# Patient Record
Sex: FEMALE | Race: WHITE | Employment: FULL TIME | URBAN - NONMETROPOLITAN AREA
[De-identification: names, ages, dates, MRNs, and addresses within clinical notes are randomized per-mention and may not be internally consistent; named-entity substitution may affect disease eponyms.]

---

## 2017-01-30 ENCOUNTER — TELEPHONE (OUTPATIENT)
Dept: CARDIOLOGY | Age: 62
End: 2017-01-30

## 2017-01-30 DIAGNOSIS — I47.1 SVT (SUPRAVENTRICULAR TACHYCARDIA) (HCC): Primary | ICD-10-CM

## 2017-01-30 DIAGNOSIS — R00.2 FLUTTERING SENSATION OF HEART: ICD-10-CM

## 2017-01-30 DIAGNOSIS — R42 LIGHTHEADEDNESS: ICD-10-CM

## 2017-02-21 ENCOUNTER — TELEPHONE (OUTPATIENT)
Dept: CARDIOLOGY | Age: 62
End: 2017-02-21

## 2017-03-30 ENCOUNTER — OFFICE VISIT (OUTPATIENT)
Dept: CARDIOLOGY | Age: 62
End: 2017-03-30

## 2017-03-30 VITALS
BODY MASS INDEX: 20.31 KG/M2 | SYSTOLIC BLOOD PRESSURE: 128 MMHG | DIASTOLIC BLOOD PRESSURE: 78 MMHG | WEIGHT: 141.9 LBS | HEIGHT: 70 IN

## 2017-03-30 DIAGNOSIS — R53.83 FATIGUE, UNSPECIFIED TYPE: ICD-10-CM

## 2017-03-30 DIAGNOSIS — I47.1 SVT (SUPRAVENTRICULAR TACHYCARDIA) (HCC): Primary | ICD-10-CM

## 2017-03-30 PROCEDURE — 93000 ELECTROCARDIOGRAM COMPLETE: CPT | Performed by: INTERNAL MEDICINE

## 2017-03-30 PROCEDURE — 99213 OFFICE O/P EST LOW 20 MIN: CPT | Performed by: INTERNAL MEDICINE

## 2017-03-30 RX ORDER — DILTIAZEM HYDROCHLORIDE 180 MG/1
180 CAPSULE, COATED, EXTENDED RELEASE ORAL DAILY
Qty: 30 CAPSULE | Refills: 5 | Status: SHIPPED | OUTPATIENT
Start: 2017-03-30 | End: 2017-09-28 | Stop reason: SDUPTHER

## 2017-03-30 RX ORDER — DILTIAZEM HYDROCHLORIDE 180 MG/1
180 CAPSULE, COATED, EXTENDED RELEASE ORAL DAILY
COMMUNITY
End: 2017-03-30 | Stop reason: SDUPTHER

## 2017-08-03 ENCOUNTER — HOSPITAL ENCOUNTER (EMERGENCY)
Age: 62
Discharge: HOME | End: 2017-08-03

## 2017-08-03 VITALS
DIASTOLIC BLOOD PRESSURE: 77 MMHG | WEIGHT: 139 LBS | OXYGEN SATURATION: 98 % | SYSTOLIC BLOOD PRESSURE: 118 MMHG | HEART RATE: 74 BPM | BODY MASS INDEX: 19.9 KG/M2 | TEMPERATURE: 98.2 F | RESPIRATION RATE: 16 BRPM | HEIGHT: 70 IN

## 2017-08-03 DIAGNOSIS — J20.9 ACUTE BRONCHITIS, UNSPECIFIED ORGANISM: Primary | ICD-10-CM

## 2017-08-03 PROCEDURE — 99213 OFFICE O/P EST LOW 20 MIN: CPT | Performed by: NURSE PRACTITIONER

## 2017-08-03 PROCEDURE — 99212 OFFICE O/P EST SF 10 MIN: CPT

## 2017-08-03 RX ORDER — GUAIFENESIN AND CODEINE PHOSPHATE 100; 10 MG/5ML; MG/5ML
5 SOLUTION ORAL 3 TIMES DAILY PRN
Qty: 120 ML | Refills: 0 | Status: SHIPPED | OUTPATIENT
Start: 2017-08-03 | End: 2017-08-10

## 2017-08-03 RX ORDER — DOXYCYCLINE HYCLATE 100 MG/1
100 CAPSULE ORAL 2 TIMES DAILY
Qty: 20 CAPSULE | Refills: 0 | Status: SHIPPED | OUTPATIENT
Start: 2017-08-03 | End: 2017-08-13

## 2017-08-03 RX ORDER — BENZONATATE 200 MG/1
200 CAPSULE ORAL 3 TIMES DAILY PRN
Qty: 21 CAPSULE | Refills: 0 | Status: SHIPPED | OUTPATIENT
Start: 2017-08-03 | End: 2017-08-10

## 2017-08-03 ASSESSMENT — ENCOUNTER SYMPTOMS
SINUS PRESSURE: 0
DIARRHEA: 0
COUGH: 1
CHEST TIGHTNESS: 0
SORE THROAT: 0
EYE DISCHARGE: 0
SINUS CONGESTION: 0
SHORTNESS OF BREATH: 1
NAUSEA: 1
CONSTIPATION: 0
VOMITING: 0
WHEEZING: 0
RHINORRHEA: 0

## 2017-08-08 ENCOUNTER — HOSPITAL ENCOUNTER (OUTPATIENT)
Age: 62
Discharge: HOME | End: 2017-08-08

## 2017-08-08 ENCOUNTER — OFFICE VISIT (OUTPATIENT)
Dept: INTERNAL MEDICINE CLINIC | Age: 62
End: 2017-08-08

## 2017-08-08 VITALS
DIASTOLIC BLOOD PRESSURE: 70 MMHG | TEMPERATURE: 98.1 F | OXYGEN SATURATION: 98 % | HEIGHT: 70 IN | BODY MASS INDEX: 20.04 KG/M2 | HEART RATE: 80 BPM | SYSTOLIC BLOOD PRESSURE: 100 MMHG | WEIGHT: 140 LBS

## 2017-08-08 DIAGNOSIS — R50.9 FEVER, UNSPECIFIED FEVER CAUSE: ICD-10-CM

## 2017-08-08 DIAGNOSIS — J40 BRONCHITIS: ICD-10-CM

## 2017-08-08 DIAGNOSIS — I10 ESSENTIAL HYPERTENSION: ICD-10-CM

## 2017-08-08 DIAGNOSIS — J40 BRONCHITIS: Primary | ICD-10-CM

## 2017-08-08 DIAGNOSIS — J06.9 UPPER RESPIRATORY TRACT INFECTION, UNSPECIFIED TYPE: Primary | ICD-10-CM

## 2017-08-08 LAB
ANION GAP SERPL CALCULATED.3IONS-SCNC: 12 MEQ/L (ref 8–16)
BILIRUBIN URINE: NEGATIVE
BLOOD, URINE: NEGATIVE
BUN BLDV-MCNC: 17 MG/DL (ref 7–22)
CALCIUM SERPL-MCNC: 9.7 MG/DL (ref 8.5–10.5)
CHARACTER, URINE: CLEAR
CHLORIDE BLD-SCNC: 102 MEQ/L (ref 98–111)
CO2: 27 MEQ/L (ref 23–33)
COLOR: YELLOW
CREAT SERPL-MCNC: 0.7 MG/DL (ref 0.4–1.2)
GFR SERPL CREATININE-BSD FRML MDRD: 85 ML/MIN/1.73M2
GLUCOSE BLD-MCNC: 101 MG/DL (ref 70–108)
GLUCOSE URINE: NEGATIVE MG/DL
HCT VFR BLD CALC: 40 % (ref 37–47)
HEMOGLOBIN: 13.6 GM/DL (ref 12–16)
KETONES, URINE: NEGATIVE
LEUKOCYTE ESTERASE, URINE: NEGATIVE
MCH RBC QN AUTO: 30.7 PG (ref 27–31)
MCHC RBC AUTO-ENTMCNC: 33.9 GM/DL (ref 33–37)
MCV RBC AUTO: 90.6 FL (ref 81–99)
NITRITE, URINE: NEGATIVE
PDW BLD-RTO: 13.6 % (ref 11.5–14.5)
PH UA: 5.5
PLATELET # BLD: 231 THOU/MM3 (ref 130–400)
PMV BLD AUTO: 8.5 MCM (ref 7.4–10.4)
POTASSIUM SERPL-SCNC: 4.2 MEQ/L (ref 3.5–5.2)
PROTEIN UA: NEGATIVE
RBC # BLD: 4.42 MILL/MM3 (ref 4.2–5.4)
SODIUM BLD-SCNC: 141 MEQ/L (ref 135–145)
SPECIFIC GRAVITY, URINE: 1.01 (ref 1–1.03)
UROBILINOGEN, URINE: 0.2 EU/DL
WBC # BLD: 4.1 THOU/MM3 (ref 4.8–10.8)

## 2017-08-08 PROCEDURE — 36415 COLL VENOUS BLD VENIPUNCTURE: CPT

## 2017-08-08 PROCEDURE — 99213 OFFICE O/P EST LOW 20 MIN: CPT | Performed by: INTERNAL MEDICINE

## 2017-08-08 PROCEDURE — 80048 BASIC METABOLIC PNL TOTAL CA: CPT

## 2017-08-08 PROCEDURE — 85027 COMPLETE CBC AUTOMATED: CPT

## 2017-08-08 PROCEDURE — 81003 URINALYSIS AUTO W/O SCOPE: CPT

## 2017-08-08 RX ORDER — AMOXICILLIN AND CLAVULANATE POTASSIUM 875; 125 MG/1; MG/1
1 TABLET, FILM COATED ORAL 2 TIMES DAILY
Qty: 10 TABLET | Refills: 0 | Status: SHIPPED | OUTPATIENT
Start: 2017-08-08 | End: 2017-08-13

## 2017-08-16 RX ORDER — PSEUDOEPHEDRINE HYDROCHLORIDE 30 MG/1
60 TABLET ORAL EVERY 6 HOURS
Qty: 40 TABLET | Refills: 0 | OUTPATIENT
Start: 2017-08-16 | End: 2017-09-28 | Stop reason: ALTCHOICE

## 2017-08-21 ENCOUNTER — TELEPHONE (OUTPATIENT)
Dept: ENT CLINIC | Age: 62
End: 2017-08-21

## 2017-08-22 ENCOUNTER — OFFICE VISIT (OUTPATIENT)
Dept: ENT CLINIC | Age: 62
End: 2017-08-22

## 2017-08-22 VITALS
HEIGHT: 70 IN | DIASTOLIC BLOOD PRESSURE: 74 MMHG | SYSTOLIC BLOOD PRESSURE: 122 MMHG | TEMPERATURE: 97.7 F | BODY MASS INDEX: 19.81 KG/M2 | WEIGHT: 138.4 LBS | RESPIRATION RATE: 16 BRPM | HEART RATE: 68 BPM

## 2017-08-22 DIAGNOSIS — H61.22 IMPACTED CERUMEN OF LEFT EAR: ICD-10-CM

## 2017-08-22 DIAGNOSIS — R49.0 HOARSENESS: ICD-10-CM

## 2017-08-22 DIAGNOSIS — R05.9 COUGH: ICD-10-CM

## 2017-08-22 DIAGNOSIS — K21.9 GASTROESOPHAGEAL REFLUX DISEASE WITHOUT ESOPHAGITIS: ICD-10-CM

## 2017-08-22 DIAGNOSIS — R09.89 GLOBUS SENSATION: Primary | ICD-10-CM

## 2017-08-22 PROCEDURE — 99243 OFF/OP CNSLTJ NEW/EST LOW 30: CPT | Performed by: OTOLARYNGOLOGY

## 2017-08-22 PROCEDURE — 31575 DIAGNOSTIC LARYNGOSCOPY: CPT | Performed by: OTOLARYNGOLOGY

## 2017-08-22 PROCEDURE — 69210 REMOVE IMPACTED EAR WAX UNI: CPT | Performed by: OTOLARYNGOLOGY

## 2017-08-22 PROCEDURE — 99999 PR EAR MICROSCOPY EXAMINATION: CPT | Performed by: OTOLARYNGOLOGY

## 2017-08-22 RX ORDER — OMEPRAZOLE 20 MG/1
20 CAPSULE, DELAYED RELEASE ORAL DAILY
Qty: 30 CAPSULE | Refills: 3 | Status: SHIPPED | OUTPATIENT
Start: 2017-08-22 | End: 2017-09-28

## 2017-08-22 RX ORDER — AZITHROMYCIN 250 MG/1
TABLET, FILM COATED ORAL
Qty: 2 PACKET | Refills: 0 | Status: SHIPPED | OUTPATIENT
Start: 2017-08-22 | End: 2017-09-28 | Stop reason: ALTCHOICE

## 2017-08-22 ASSESSMENT — ENCOUNTER SYMPTOMS
APNEA: 0
NAUSEA: 0
TROUBLE SWALLOWING: 1
WHEEZING: 0
VOMITING: 0
CHEST TIGHTNESS: 0
ABDOMINAL PAIN: 0
SHORTNESS OF BREATH: 1
CHOKING: 1
FACIAL SWELLING: 0
SINUS PRESSURE: 1
RHINORRHEA: 1
STRIDOR: 0
VOICE CHANGE: 1
COLOR CHANGE: 0
COUGH: 1
DIARRHEA: 0
SORE THROAT: 0

## 2017-09-28 ENCOUNTER — OFFICE VISIT (OUTPATIENT)
Dept: CARDIOLOGY CLINIC | Age: 62
End: 2017-09-28

## 2017-09-28 VITALS
BODY MASS INDEX: 19.74 KG/M2 | SYSTOLIC BLOOD PRESSURE: 128 MMHG | HEART RATE: 68 BPM | DIASTOLIC BLOOD PRESSURE: 62 MMHG | HEIGHT: 70 IN | WEIGHT: 137.9 LBS

## 2017-09-28 DIAGNOSIS — I47.1 SVT (SUPRAVENTRICULAR TACHYCARDIA) (HCC): ICD-10-CM

## 2017-09-28 DIAGNOSIS — I10 ESSENTIAL HYPERTENSION: ICD-10-CM

## 2017-09-28 DIAGNOSIS — I71.20 THORACIC AORTIC ANEURYSM WITHOUT RUPTURE: ICD-10-CM

## 2017-09-28 DIAGNOSIS — R06.02 SOB (SHORTNESS OF BREATH): Primary | ICD-10-CM

## 2017-09-28 PROCEDURE — 99213 OFFICE O/P EST LOW 20 MIN: CPT | Performed by: INTERNAL MEDICINE

## 2017-09-28 RX ORDER — DILTIAZEM HYDROCHLORIDE 180 MG/1
180 CAPSULE, COATED, EXTENDED RELEASE ORAL DAILY
Qty: 30 CAPSULE | Refills: 6 | Status: SHIPPED | OUTPATIENT
Start: 2017-09-28 | End: 2018-04-11 | Stop reason: SDUPTHER

## 2017-10-12 ENCOUNTER — HOSPITAL ENCOUNTER (OUTPATIENT)
Dept: NON INVASIVE DIAGNOSTICS | Age: 62
Discharge: HOME | End: 2017-10-12

## 2017-10-12 DIAGNOSIS — R06.02 SOB (SHORTNESS OF BREATH): ICD-10-CM

## 2017-10-12 LAB
LV EF: 60 %
LVEF MODALITY: NORMAL

## 2017-10-12 PROCEDURE — 93306 TTE W/DOPPLER COMPLETE: CPT

## 2017-10-16 ENCOUNTER — TELEPHONE (OUTPATIENT)
Dept: CARDIOLOGY CLINIC | Age: 62
End: 2017-10-16

## 2017-10-16 DIAGNOSIS — I71.20 THORACIC AORTIC ANEURYSM WITHOUT RUPTURE: Primary | ICD-10-CM

## 2017-10-16 NOTE — TELEPHONE ENCOUNTER
Please see echo    Summary   Systolic function was normal.   Ejection fraction is visually estimated at 60%.   Normal right ventricular size and function.   Right ventricular systolic pressure of mm Hg consistent with mild   pulmonary hypertension.   Structurally normal mitral valve.   Mild mitral regurgitation is present.   Tricuspid valve is structurally normal.   Mild to moderate tricuspid regurgitation.   Dilation of the aortic root, ascending and descending aorta at 3.70 cm

## 2017-10-19 ENCOUNTER — HOSPITAL ENCOUNTER (OUTPATIENT)
Dept: CT IMAGING | Age: 62
Discharge: HOME | End: 2017-10-19

## 2017-10-19 DIAGNOSIS — I71.20 THORACIC AORTIC ANEURYSM WITHOUT RUPTURE: ICD-10-CM

## 2017-10-19 LAB — POC CREATININE WHOLE BLOOD: 0.9 MG/DL (ref 0.5–1.2)

## 2017-10-19 PROCEDURE — 6360000004 HC RX CONTRAST MEDICATION: Performed by: INTERNAL MEDICINE

## 2017-10-19 PROCEDURE — 82565 ASSAY OF CREATININE: CPT

## 2017-10-19 PROCEDURE — 71275 CT ANGIOGRAPHY CHEST: CPT

## 2017-10-19 RX ADMIN — IOPAMIDOL 80 ML: 755 INJECTION, SOLUTION INTRAVENOUS at 09:24

## 2017-10-23 ENCOUNTER — TELEPHONE (OUTPATIENT)
Dept: CARDIOLOGY CLINIC | Age: 62
End: 2017-10-23

## 2017-10-23 NOTE — TELEPHONE ENCOUNTER
CTA Chest below, follow up 3-29-18? Impression   1. Fiber emphysematous lungs. Small stable subcentimeter nodules both lungs, clearly benign. See comments above. 2. 4.1 cm aneurysm ascending thoracic aorta, slightly larger than prior study.

## 2017-12-20 DIAGNOSIS — J06.9 UPPER RESPIRATORY TRACT INFECTION, UNSPECIFIED TYPE: Primary | ICD-10-CM

## 2017-12-20 RX ORDER — AZITHROMYCIN 250 MG/1
TABLET, FILM COATED ORAL
Qty: 1 PACKET | Refills: 0 | Status: SHIPPED | OUTPATIENT
Start: 2017-12-20 | End: 2017-12-30

## 2018-04-11 ENCOUNTER — OFFICE VISIT (OUTPATIENT)
Dept: CARDIOLOGY CLINIC | Age: 63
End: 2018-04-11

## 2018-04-11 VITALS
BODY MASS INDEX: 19.64 KG/M2 | WEIGHT: 137.2 LBS | SYSTOLIC BLOOD PRESSURE: 122 MMHG | HEART RATE: 64 BPM | HEIGHT: 70 IN | DIASTOLIC BLOOD PRESSURE: 80 MMHG

## 2018-04-11 DIAGNOSIS — I71.20 THORACIC AORTIC ANEURYSM WITHOUT RUPTURE: Primary | ICD-10-CM

## 2018-04-11 DIAGNOSIS — I47.1 SVT (SUPRAVENTRICULAR TACHYCARDIA) (HCC): ICD-10-CM

## 2018-04-11 PROCEDURE — 93000 ELECTROCARDIOGRAM COMPLETE: CPT | Performed by: INTERNAL MEDICINE

## 2018-04-11 PROCEDURE — 99214 OFFICE O/P EST MOD 30 MIN: CPT | Performed by: INTERNAL MEDICINE

## 2018-04-11 RX ORDER — LOSARTAN POTASSIUM 25 MG/1
25 TABLET ORAL DAILY
Qty: 30 TABLET | Refills: 3 | Status: SHIPPED | OUTPATIENT
Start: 2018-04-11 | End: 2018-06-07

## 2018-04-11 RX ORDER — METOPROLOL SUCCINATE 25 MG/1
25 TABLET, EXTENDED RELEASE ORAL DAILY
Qty: 30 TABLET | Refills: 3 | Status: SHIPPED | OUTPATIENT
Start: 2018-04-11 | End: 2018-08-26 | Stop reason: SDUPTHER

## 2018-04-11 RX ORDER — ATORVASTATIN CALCIUM 40 MG/1
40 TABLET, FILM COATED ORAL DAILY
Qty: 30 TABLET | Refills: 3 | Status: SHIPPED | OUTPATIENT
Start: 2018-04-11 | End: 2018-06-07

## 2018-04-11 RX ORDER — DILTIAZEM HYDROCHLORIDE 180 MG/1
180 CAPSULE, COATED, EXTENDED RELEASE ORAL DAILY
Qty: 30 CAPSULE | Refills: 11 | Status: CANCELLED | OUTPATIENT
Start: 2018-04-11

## 2018-04-11 ASSESSMENT — ENCOUNTER SYMPTOMS
EYE DISCHARGE: 0
NAUSEA: 0
EYE ITCHING: 0
SORE THROAT: 0
BACK PAIN: 0
EYE REDNESS: 0
EYE PAIN: 0
CHEST TIGHTNESS: 0
BLOOD IN STOOL: 0
CONSTIPATION: 0
DIARRHEA: 0
SINUS PRESSURE: 0
ABDOMINAL PAIN: 0
SHORTNESS OF BREATH: 0
ABDOMINAL DISTENTION: 0
APNEA: 0
COUGH: 0

## 2018-04-13 RX ORDER — DILTIAZEM HYDROCHLORIDE 120 MG/1
120 CAPSULE, COATED, EXTENDED RELEASE ORAL DAILY
Qty: 30 CAPSULE | Refills: 1 | Status: SHIPPED | OUTPATIENT
Start: 2018-04-13 | End: 2019-04-03

## 2018-04-17 ENCOUNTER — TELEPHONE (OUTPATIENT)
Dept: CARDIOLOGY CLINIC | Age: 63
End: 2018-04-17

## 2018-05-31 ENCOUNTER — TELEPHONE (OUTPATIENT)
Dept: CARDIOLOGY CLINIC | Age: 63
End: 2018-05-31

## 2018-06-07 ENCOUNTER — OFFICE VISIT (OUTPATIENT)
Dept: CARDIOLOGY CLINIC | Age: 63
End: 2018-06-07

## 2018-06-07 VITALS — HEART RATE: 60 BPM | SYSTOLIC BLOOD PRESSURE: 124 MMHG | DIASTOLIC BLOOD PRESSURE: 66 MMHG

## 2018-06-07 DIAGNOSIS — I10 ESSENTIAL HYPERTENSION: Primary | ICD-10-CM

## 2018-06-07 PROCEDURE — 99211 OFF/OP EST MAY X REQ PHY/QHP: CPT | Performed by: INTERNAL MEDICINE

## 2018-08-27 RX ORDER — METOPROLOL SUCCINATE 25 MG/1
25 TABLET, EXTENDED RELEASE ORAL DAILY
Qty: 30 TABLET | Refills: 7 | Status: SHIPPED | OUTPATIENT
Start: 2018-08-27 | End: 2019-05-05 | Stop reason: SDUPTHER

## 2018-12-04 RX ORDER — AZITHROMYCIN 250 MG/1
250 TABLET, FILM COATED ORAL SEE ADMIN INSTRUCTIONS
Qty: 6 TABLET | Refills: 0 | OUTPATIENT
Start: 2018-12-04 | End: 2018-12-09

## 2018-12-04 NOTE — TELEPHONE ENCOUNTER
Per joss From Dr. Regina Ponce called in Z pack to [facility]. I spoke to pedro

## 2019-03-18 ENCOUNTER — HOSPITAL ENCOUNTER (OUTPATIENT)
Dept: CT IMAGING | Age: 64
Discharge: HOME | End: 2019-03-18

## 2019-03-18 DIAGNOSIS — I71.20 THORACIC AORTIC ANEURYSM WITHOUT RUPTURE: ICD-10-CM

## 2019-03-18 LAB — POC CREATININE WHOLE BLOOD: 0.8 MG/DL (ref 0.5–1.2)

## 2019-03-18 PROCEDURE — 82565 ASSAY OF CREATININE: CPT

## 2019-03-18 PROCEDURE — 6360000004 HC RX CONTRAST MEDICATION: Performed by: INTERNAL MEDICINE

## 2019-03-18 PROCEDURE — 71275 CT ANGIOGRAPHY CHEST: CPT

## 2019-03-18 RX ADMIN — IOPAMIDOL 90 ML: 755 INJECTION, SOLUTION INTRAVENOUS at 10:26

## 2019-04-03 ENCOUNTER — OFFICE VISIT (OUTPATIENT)
Dept: CARDIOLOGY CLINIC | Age: 64
End: 2019-04-03

## 2019-04-03 VITALS
HEART RATE: 80 BPM | BODY MASS INDEX: 19.84 KG/M2 | HEIGHT: 70 IN | WEIGHT: 138.6 LBS | SYSTOLIC BLOOD PRESSURE: 110 MMHG | DIASTOLIC BLOOD PRESSURE: 64 MMHG

## 2019-04-03 DIAGNOSIS — E78.5 HYPERLIPIDEMIA, UNSPECIFIED HYPERLIPIDEMIA TYPE: ICD-10-CM

## 2019-04-03 DIAGNOSIS — J43.9 PULMONARY EMPHYSEMA, UNSPECIFIED EMPHYSEMA TYPE (HCC): Primary | ICD-10-CM

## 2019-04-03 DIAGNOSIS — R06.01 ORTHOPNEA: ICD-10-CM

## 2019-04-03 PROCEDURE — 99213 OFFICE O/P EST LOW 20 MIN: CPT | Performed by: INTERNAL MEDICINE

## 2019-04-03 NOTE — PROGRESS NOTES
[address] [facility] CARDIOLOGY  [address]  [address] [place] [address] [address] [address]  Dept: [phone]  Dept Fax: [phone]  Loc: [phone]    Visit Date: 4/3/2019    Ms. Lalit Sherman is a 61 y.o. female  who presented for:  Chief Complaint   Patient presents with    Hypertension    1 Year Follow Up    Palpitations       HPI:   HPI   62 yo F who presents for follow-up. She notices orthopnea and sob. No PND. She has a thoracic aortic aneurysm under surveillance. She also complain of trouble swallowing. Preserved EF, PASP 33 mmHg . CTA shows TAA 4.1 cm 3/2019. She has emphysematous changes of the lungs. Never smoker. She has a normal exercise capacity. Current Outpatient Medications:     metoprolol succinate (TOPROL XL) 25 MG extended release tablet, take 1 tablet by mouth daily, Disp: 30 tablet, Rfl: 7    ibuprofen (ADVIL;MOTRIN) 200 MG tablet, Take 200 mg by mouth every 6 hours as needed for Pain., Disp: , Rfl:     aspirin 81 MG chewable tablet, Take 81 mg by mouth daily Takes occas., Disp: , Rfl:     Past Medical History  Cornelius Trejo  has a past medical history of Aortic aneurysm, thoracic ([address] GERD (gastroesophageal reflux disease), Hyperlipidemia, Hypertension, Mitral valve prolapse, Pneumonia, SOB (shortness of breath), and Tricuspid regurgitation. Social History  Cornelius Trejo  reports that she has never smoked. She has never used smokeless tobacco. She reports that she drinks alcohol. She reports that she does not use drugs. Family History  Cornelius Trejo family history includes Heart Disease in her paternal grandfather; Heart Disease (age of onset: 80) in her father. There is no family history of bicuspid aortic valve, aneurysms, heart transplant, pacemakers, defibrillators, or sudden cardiac death.       Past Surgical History   Past Surgical History:   Procedure Laterality Date    CARDIAC CATHETERIZATION  3/4/2010     SECTION      [facility]                                        Interpreting        Sully Guerrero DO                                     Physician     Procedure    Type of Study      TTE procedure:ECHOCARDIOGRAM COMPLETE 2D W DOPPLER W COLOR. Procedure Date  Date: 10/12/2017 Start: 09:26 AM    Study Location: Echo Lab  Technical Quality: Adequate visualization    Indications:Shortness of breath. Additional Medical History:Hypertension, Hyperlipidemia, Thoracic aortic  aneurysm, SVT, GERD, Mitral valve prolapse, Pneumonia. Patient Status: Routine    Height: 70 inches Weight: 137 pounds BSA: 1.78 m^2 BMI: 19.66 kg/m^2    BP: 128/62 mmHg     Conclusions      Summary   Systolic function was normal.   Ejection fraction is visually estimated at 60%. Normal right ventricular size and function. Right ventricular systolic pressure of mm Hg consistent with mild   pulmonary hypertension. Structurally normal mitral valve. Mild mitral regurgitation is present. Tricuspid valve is structurally normal.   Mild to moderate tricuspid regurgitation. Dilation of the aortic root, ascending and descending aorta at 3.70 cm      Signature      ----------------------------------------------------------------   Electronically signed by Sully Guerrero DO (Interpreting   physician) on 10/12/2017 at 07:44 PM   ----------------------------------------------------------------      Findings      Mitral Valve   Structurally normal mitral valve. Mild mitral regurgitation is present. Aortic Valve   The aortic valve appears to be trileaflet with good leaflet separation. Tricuspid Valve   Tricuspid valve is structurally normal.   Mild to moderate tricuspid regurgitation. Pulmonic Valve   Pulmonic valve is structurally normal.   Trivial pulmonic regurgitation visualized. Left Atrium   Normal size left atrium.       Left Ventricle   Systolic function was normal.   Ejection fraction is visually estimated at mmHg   MV A' Lateral Velocity:   10.6 cm/s   E/E' septal: 11.03                               MO ED Velocity: 101 cm/s   E/E' lateral: 8.07     [email or web address] [id number]        Assessment/Plan   Orthopnea  Emphysematous changes of the lung  Never smoker  TAA - 4.1 cm (stable)  Preserved EF  Will check PFTS, TTE, and refer to Pulmonary for further evaluation. She is on ASA, metoprolol. She does not want to be on ACEi and Statin but will consider it. Re-check FLP. Avoid heavy exertion and straining. Discussed diet/exercise/BP/weight loss/health lifestyle choices/lipids; the patient understands the goals and will try to comply.     Disposition:  6-12 months    Electronically signed by Zackery Cervantes MD   4/3/2019 at 9:15 AM

## 2019-04-05 ENCOUNTER — OFFICE VISIT (OUTPATIENT)
Dept: INTERNAL MEDICINE CLINIC | Age: 64
End: 2019-04-05

## 2019-04-05 VITALS
DIASTOLIC BLOOD PRESSURE: 88 MMHG | SYSTOLIC BLOOD PRESSURE: 134 MMHG | RESPIRATION RATE: 14 BRPM | HEIGHT: 69 IN | WEIGHT: 140.6 LBS | BODY MASS INDEX: 20.83 KG/M2 | HEART RATE: 72 BPM

## 2019-04-05 DIAGNOSIS — J44.9 CHRONIC OBSTRUCTIVE PULMONARY DISEASE, UNSPECIFIED COPD TYPE (HCC): ICD-10-CM

## 2019-04-05 DIAGNOSIS — I71.20 THORACIC AORTIC ANEURYSM WITHOUT RUPTURE: Primary | ICD-10-CM

## 2019-04-05 DIAGNOSIS — I10 ESSENTIAL HYPERTENSION: ICD-10-CM

## 2019-04-05 PROCEDURE — 99214 OFFICE O/P EST MOD 30 MIN: CPT | Performed by: INTERNAL MEDICINE

## 2019-04-05 NOTE — PROGRESS NOTES
[facility]  PHYSICIANS [address], [address]  Dept: [phone]  Dept Fax: [phone]      Chief Complaint   Patient presents with    Other     PER DR Lane Blanca       Patient presents for evaluation of abnormal CT of the chest  I last saw her 8/17  Patient has not had any admissions to the hospital  since the last visit here. Pertinent past history reviewed and summarized below    She is followed by cardiology for a thoracic aortic aneurysm. Recent CT of the chest showed fibro-emphysematous lungs. Cardiology set up PFTs and a pulmonary consult  I am friends with the patient. She called me to see if I can see her. She does have shortness of breath. She says she wakes up at night. She is under a lot of stress. She also has hot flashes. She's never smoked. She's never been around secondhand smoke  Past Medical History:   Diagnosis Date    Aortic aneurysm, thoracic (HCC)     dilatation    GERD (gastroesophageal reflux disease)     Hyperlipidemia     Hypertension     Mitral valve prolapse     Pneumonia     SOB (shortness of breath)     Tricuspid regurgitation     mild       Current Outpatient Medications   Medication Sig Dispense Refill    metoprolol succinate (TOPROL XL) 25 MG extended release tablet take 1 tablet by mouth daily 30 tablet 7    ibuprofen (ADVIL;MOTRIN) 200 MG tablet Take 200 mg by mouth every 6 hours as needed for Pain.  aspirin 81 MG chewable tablet Take 81 mg by mouth daily Takes occas. No current facility-administered medications for this visit.         Review of Systems - History obtained from the patient  General ROS: negative  Psychological ROS: positive for - anxiety  Respiratory ROS: positive for - shortness of breath  Cardiovascular ROS: negative for - chest pain or loss of consciousness  Gastrointestinal ROS: no abdominal pain, change in bowel habits, or black or bloody stools  Genito-Urinary ROS: no dysuria, trouble voiding, or hematuria  Musculoskeletal ROS: negative  Neurological ROS: no TIA or stroke symptoms  Dermatological ROS: negative  Blood pressure 134/88, pulse 72, resp. rate 14, height 5' 9.49\" (1.765 m), weight 140 lb 9.6 oz (63.8 kg). Physical Examination: General appearance - alert, well appearing, and in no distress  HEENT- throat clear. Sinuses nontender  Neck - supple, no significant adenopathy  Lymphatics - no palpable lymphadenopathy, no hepatosplenomegaly  Heart - normal rate, regular rhythm, normal S1, S2, no murmurs, rubs, clicks or gallops  . Lungs-good air flow bilaterally, no wheezes  Abdomen - soft, nontender, nondistended, no masses or organomegaly  Extremities - peripheral pulses normal, no pedal edema, no clubbing or cyanosis  Skin - normal coloration and turgor, no rashes, no suspicious skin lesions noted          CT of chest     Impression       1. Redemonstration of a 4.1 cm ascending aortic aneurysm which is unchanged since the prior examination. 2. Emphysematous changes of the lungs with multiple stable subcentimeter pulmonary nodules which are unchanged and likely benign.            Diagnosis Orders   1. Thoracic aortic aneurysm without rupture ([address]     2. Essential hypertension     3. Chronic obstructive pulmonary disease, unspecified COPD type [facility])         Patient has PFTs scheduled for beginning of May. She was worked up by Dr. Josh Drummond for pulmonary nodules a couple of years ago. He reassured her these were benign. Clinically she does not have COPD  I told her if her PFTs indicate that she would need to see a pulmonologist without a smoking history. Otherwise, I offered reassurance.   If she does have COPD would need to workup for alpha-1 antitrypsin deficiency

## 2019-04-26 ENCOUNTER — HOSPITAL ENCOUNTER (OUTPATIENT)
Dept: PULMONOLOGY | Age: 64
Discharge: HOME | End: 2019-04-26

## 2019-04-26 ENCOUNTER — HOSPITAL ENCOUNTER (OUTPATIENT)
Dept: NON INVASIVE DIAGNOSTICS | Age: 64
Discharge: HOME | End: 2019-04-26

## 2019-04-26 DIAGNOSIS — R06.01 ORTHOPNEA: ICD-10-CM

## 2019-04-26 DIAGNOSIS — J43.9 PULMONARY EMPHYSEMA, UNSPECIFIED EMPHYSEMA TYPE (HCC): ICD-10-CM

## 2019-04-26 LAB
LV EF: 60 %
LVEF MODALITY: NORMAL

## 2019-04-26 PROCEDURE — 93306 TTE W/DOPPLER COMPLETE: CPT

## 2019-04-26 PROCEDURE — 94726 PLETHYSMOGRAPHY LUNG VOLUMES: CPT

## 2019-04-26 PROCEDURE — 2709999900 HC NON-CHARGEABLE SUPPLY

## 2019-04-26 PROCEDURE — 94060 EVALUATION OF WHEEZING: CPT

## 2019-04-26 PROCEDURE — 94729 DIFFUSING CAPACITY: CPT

## 2019-05-01 ENCOUNTER — TELEPHONE (OUTPATIENT)
Dept: CARDIOLOGY CLINIC | Age: 64
End: 2019-05-01

## 2019-05-06 RX ORDER — METOPROLOL SUCCINATE 25 MG/1
TABLET, EXTENDED RELEASE ORAL
Qty: 30 TABLET | Refills: 11 | Status: SHIPPED | OUTPATIENT
Start: 2019-05-06 | End: 2020-04-21

## 2019-05-13 NOTE — PROGRESS NOTES
[facility]                                                    Pulmonary medicine clinic initial consult note.//She is an established patient of Dr. Rebeca Mckee MD at [address], [address] for pulmonary medicine in the past. She came for evaluation today with me. She was seen by Dr. Tyler Quinn for the last time on 19    Lung Nodule Screening     [] Qualifies    [x] Does not qualify   [] Declined    [] Completed      Patient: Juventino Pompa  : 1955      Chief complaint/[place]: Juventino Pompa is a 61 y. o.oldfemale came for further evaluation regarding her abnormal CT chest suggestive of Emphysematous changes of the lungs and ? COPD with referral from Dr. Sachin Carcamo MD.     She is currently not using any oxygen supplementation at rest, exercise or during sleep/at night time. She is having shortness of breath: Yes  Onset: intermittent   Duration:3 to 4years  Diurnal variation:  None. Functional status prior to beginning of symptoms: Unlimited on level ground. Current functional capacity on level ground: 1mile on level ground. She can climb steps: Yes  Flights of steps she can climb: 3  She admits to orthopnea. She admits to paroxysmal nocturnal dyspnea. She is having cough: Yes  Duration of cough: Intermittent. Her cough is associated with sputum production: No   Hemoptysis:No  Diurnal variation: Worse in the morning  Relieving factors: No  Aggravating factors: No    She is having chest pain:No  She gives a history of fever: No  Chills & rigors:No    She was never diagnosed with pulmonary diseases I.e bronchial asthma, COPD,Pulmonary fibrosis, Sarcoidosis, pulmonary embolism,pulmonary hypertension or pleural effusion/s in the past. She was evaluated by MD Michelle in 2016 for lung nodules- no follow up so far. She denies any hemoptysis.  She was never diagnosed with pulmonary tuberculosis in the past. She denies any recent exposure to any patients with tuberculosis. She denies any recent travel to endemic places of tuberculosis. 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 History of tobacco smoking:No  History of recreational or IV drug use in the past:NO     History of exposure to coal mines/coal dust: NO  History of exposure to foundry dust/welding: NO  History of exposure to quarry/silica/sandblasting: NO  History of exposure to asbestos/working with breaks/ships: NO  History of exposure to farm dust: NO  History of recent travel to long distances: NO  History of exposure to birds, pigeons, or chickens in the past:NO  Pet animals at home:Yes  Dogs: 1    History of pulmonary embolism in the past: No            History of DVT in the past:No                             Review of Systems:   General/Constitutional: No recent loss of weight or appetite changes. No fever or chills. HENT: Negative. Eyes: Negative. Upper respiratory tract: Occasional nasal stuffiness with post nasal drip. Lower respiratory tract/ lungs: No cough or sputum production. No hemoptysis. Cardiovascular: No palpitations or chest pain. Gastrointestinal: No nausea or vomiting. Neurological: No focal neurologiacal weakness. Extremities: No edema. Musculoskeletal: No complaints. Genitourinary: No complaints. Hematological: Negative. Psychiatric/Behavioral: Negative. Skin: No itching.       Current Medications:          Past Medical History:   Diagnosis Date    Aortic aneurysm, thoracic (HCC)     dilatation    GERD (gastroesophageal reflux disease)     Hyperlipidemia     Hypertension     Mitral valve prolapse     Pneumonia     SOB (shortness of breath)     Tricuspid regurgitation     mild       Past Surgical History:   Procedure Laterality Date    CARDIAC CATHETERIZATION  3/4/2010     SECTION      KNEE SURGERY      right knee    TONSILLECTOMY         Allergies   Allergen Reactions    Ciprofloxacin Itching       Current Outpatient Medications   Medication Sig Dispense Refill    metoprolol succinate (TOPROL XL) 25 MG extended release tablet take 1 tablet by mouth once daily 30 tablet 11    ibuprofen (ADVIL;MOTRIN) 200 MG tablet Take 200 mg by mouth every 6 hours as needed for Pain.  aspirin 81 MG chewable tablet Take 81 mg by mouth daily Takes occas. No current facility-administered medications for this visit. Family History   Problem Relation Age of Onset    Heart Disease Father 80        cabg    Heart Disease Paternal Grandfather         x2           Physical Exam:    VITALS:  /76 (Site: Left Upper Arm, Position: Sitting)   Pulse 68   Temp 98 °F (36.7 °C)   Ht 5' 9.49\" (1.765 m)   Wt 136 lb 9.6 oz (62 kg)   SpO2 99% Comment: on RA  BMI 19.89 kg/m²   Nursing note and vitals reviewed. Constitutional: Patient appears moderately built and moderately nourished. No distress. Patient is oriented to person, place, and time. HENT:  Hypertrophied nasal turbinates with pale nasal mucosa bilaterally. Head: Normocephalic and atraumatic. Right Ear: External ear normal.   Left Ear: External ear normal.   Mouth/Throat: Oropharynx is clear and moist.  No oral thrush. Eyes: Conjunctivae are normal. Pupils are equal, round, and reactive to light. No scleral icterus. Neck: Neck supple. No JVD present. No tracheal deviation present. Cardiovascular: Normal rate, regular rhythm, normal heart sounds. No murmur heard. Pulmonary/Chest: Effort normal and breath sounds normal. No stridor. No respiratory distress. No wheezes. No rales. Patient exhibits no tenderness. Abdominal: Soft. Patient exhibits no distension. No tenderness. Musculoskeletal: Normal range of motion. Extremities: Patient exhibits no edema and no tenderness. Lymphadenopathy:  No cervical adenopathy. Neurological: Patient is alert and oriented to person, place, and time. Skin: Skin is warm and dry. Patient is not diaphoretic. Psychiatric: Patient  has a normal mood and affect. Patient behavior is normal.     Neck Circumference -  12.50   Mallampati - 1       Diagnostic Data:    Radiological Data:    CTA of chest with contrast:  Mar 18, 2019   PROCEDURE: CTA CHEST W CONTRAST   COMPARISON: CT scan dated 10/19/2017. 1. Redemonstration of a 4.1 cm ascending aortic aneurysm which is unchanged since the prior examination. 2. Emphysematous changes of the lungs with multiple stable subcentimeter pulmonary nodules which are unchanged and likely benign. Pulmonary function tests: 4/26/19              Echocardiogram:  4/26/2019   Transthoracic Echocardiography Report (TTE)   Right Ventricle   The right ventricular size was normal with normal systolic function and   wall thickness. Conclusions      Summary   Ejection fraction is visually estimated at 60%. Overall left ventricular function is normal.      Signature      ----------------------------------------------------------------   Electronically signed by Harvinder Red MD (Interpreting   physician) on 04/26/2019 at 06:29 PM   ----------------------------------------------------------------       Assessment:  -Episodic shortness of breath, occasional cough and family hx of bronchial asthma ( [de-identified] X2, Sister and grand mother). -Aortic aneurysm, thoracic ([address] -Multiple stable subcentimeter pulmonary nodules- stable for 2years which are unchanged since 2017- likely benign.    -Allergic rhinitis- Not under control.   -Hx of Sinus surgery in the past.  -GERD (gastroesophageal reflux disease). -Hypertension.  -Mitral valve prolapse. -Hyperlipidemia.  -Tricuspid regurgitation      Recommendations/Plan:  -She refused to go for Methacholine challenge test to further evaluate for hyperreactive air way diseases.   -She refused to go for any further work of her lung nodules including Serum for Antinuclear antibody (LAZARO), Rheumatoid factor (RA), Angiotensin converting enzyme level (ACE) and ESR (Erythrocyte sedimentation rate), serum for fungal serologies and serum for Quantiferon gold test to evaluate for ? Latent TB infection.  -Start patient on Flonase 50mcg/INH 2sprays each nostril daily. She  was informed about adverse effects of Flonase. She was demonstrated in my office how to use Flonase. She verbalizes understanding.  -Chandni Padron was advised to make early appointment with my clinic if she develops any constitutional symptoms including loss of weight, poor appetite or hemoptysis. She verbalizes under standing.  - Chandni Padron educated about my impression and plan. She verbalizes understanding.  - Schedule patient for follow up with my clinic in 6 months for clinical re evaluation. Patient advised to make early appointment if needed.

## 2019-05-14 ENCOUNTER — OFFICE VISIT (OUTPATIENT)
Dept: PULMONOLOGY | Age: 64
End: 2019-05-14

## 2019-05-14 VITALS
TEMPERATURE: 98 F | DIASTOLIC BLOOD PRESSURE: 76 MMHG | HEIGHT: 69 IN | BODY MASS INDEX: 20.23 KG/M2 | SYSTOLIC BLOOD PRESSURE: 134 MMHG | WEIGHT: 136.6 LBS | HEART RATE: 68 BPM | OXYGEN SATURATION: 99 %

## 2019-05-14 DIAGNOSIS — J30.89 ALLERGIC RHINITIS DUE TO OTHER ALLERGIC TRIGGER, UNSPECIFIED SEASONALITY: Primary | ICD-10-CM

## 2019-05-14 PROCEDURE — 99215 OFFICE O/P EST HI 40 MIN: CPT | Performed by: INTERNAL MEDICINE

## 2019-05-14 RX ORDER — FLUTICASONE PROPIONATE 50 MCG
2 SPRAY, SUSPENSION (ML) NASAL DAILY
Qty: 1 BOTTLE | Refills: 5 | Status: SHIPPED | OUTPATIENT
Start: 2019-05-14 | End: 2019-12-30

## 2019-05-14 NOTE — PROGRESS NOTES
Neck Circumference -  12.50   Mallampati - 1    Lung Nodule Screening     [] Qualifies    [x] Does not qualify   [] Declined    [] Completed

## 2019-09-11 ENCOUNTER — HOSPITAL ENCOUNTER (EMERGENCY)
Age: 64
Discharge: HOME | End: 2019-09-11

## 2019-09-11 ENCOUNTER — HOSPITAL ENCOUNTER (EMERGENCY)
Dept: GENERAL RADIOLOGY | Age: 64
Discharge: HOME | End: 2019-09-11

## 2019-09-11 VITALS
SYSTOLIC BLOOD PRESSURE: 119 MMHG | WEIGHT: 135 LBS | DIASTOLIC BLOOD PRESSURE: 72 MMHG | OXYGEN SATURATION: 98 % | HEIGHT: 70 IN | TEMPERATURE: 97.6 F | BODY MASS INDEX: 19.33 KG/M2 | HEART RATE: 80 BPM | RESPIRATION RATE: 16 BRPM

## 2019-09-11 DIAGNOSIS — T24.201A PARTIAL THICKNESS BURN OF RIGHT LOWER EXTREMITY, INITIAL ENCOUNTER: Primary | ICD-10-CM

## 2019-09-11 DIAGNOSIS — S22.31XA CLOSED FRACTURE OF ONE RIB OF RIGHT SIDE, INITIAL ENCOUNTER: ICD-10-CM

## 2019-09-11 PROCEDURE — 90471 IMMUNIZATION ADMIN: CPT | Performed by: NURSE PRACTITIONER

## 2019-09-11 PROCEDURE — 90715 TDAP VACCINE 7 YRS/> IM: CPT | Performed by: NURSE PRACTITIONER

## 2019-09-11 PROCEDURE — 99213 OFFICE O/P EST LOW 20 MIN: CPT

## 2019-09-11 PROCEDURE — 99213 OFFICE O/P EST LOW 20 MIN: CPT | Performed by: NURSE PRACTITIONER

## 2019-09-11 PROCEDURE — 71101 X-RAY EXAM UNILAT RIBS/CHEST: CPT

## 2019-09-11 PROCEDURE — 6360000002 HC RX W HCPCS: Performed by: NURSE PRACTITIONER

## 2019-09-11 RX ORDER — MUPIROCIN CALCIUM 20 MG/G
CREAM TOPICAL
Qty: 30 G | Refills: 0 | Status: SHIPPED | OUTPATIENT
Start: 2019-09-11 | End: 2019-10-11

## 2019-09-11 RX ORDER — CEPHALEXIN 500 MG/1
500 CAPSULE ORAL 3 TIMES DAILY
Qty: 21 CAPSULE | Refills: 0 | Status: SHIPPED | OUTPATIENT
Start: 2019-09-11 | End: 2019-09-18

## 2019-09-11 RX ADMIN — TETANUS TOXOID, REDUCED DIPHTHERIA TOXOID AND ACELLULAR PERTUSSIS VACCINE, ADSORBED 0.5 ML: 5; 2.5; 8; 8; 2.5 SUSPENSION INTRAMUSCULAR at 19:46

## 2019-09-11 ASSESSMENT — PAIN DESCRIPTION - ORIENTATION: ORIENTATION: RIGHT

## 2019-09-11 ASSESSMENT — PAIN DESCRIPTION - LOCATION: LOCATION: LEG

## 2019-09-11 ASSESSMENT — PAIN DESCRIPTION - DESCRIPTORS: DESCRIPTORS: BURNING

## 2019-09-11 ASSESSMENT — PAIN SCALES - GENERAL: PAINLEVEL_OUTOF10: 6

## 2019-09-11 ASSESSMENT — PAIN DESCRIPTION - FREQUENCY: FREQUENCY: CONTINUOUS

## 2019-09-11 ASSESSMENT — ENCOUNTER SYMPTOMS
SHORTNESS OF BREATH: 0
COUGH: 0
SORE THROAT: 0

## 2019-09-11 ASSESSMENT — PAIN DESCRIPTION - PAIN TYPE: TYPE: ACUTE PAIN
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
## 2019-09-13 ENCOUNTER — TELEPHONE (OUTPATIENT)
Dept: INTERNAL MEDICINE CLINIC | Age: 64
End: 2019-09-13

## 2019-10-28 ENCOUNTER — OFFICE VISIT (OUTPATIENT)
Dept: CARDIOLOGY CLINIC | Age: 64
End: 2019-10-28

## 2019-10-28 VITALS
WEIGHT: 137 LBS | DIASTOLIC BLOOD PRESSURE: 80 MMHG | BODY MASS INDEX: 19.61 KG/M2 | SYSTOLIC BLOOD PRESSURE: 122 MMHG | HEART RATE: 87 BPM | HEIGHT: 70 IN

## 2019-10-28 DIAGNOSIS — R00.2 PALPITATIONS: Primary | ICD-10-CM

## 2019-10-28 DIAGNOSIS — I71.20 THORACIC AORTIC ANEURYSM WITHOUT RUPTURE: ICD-10-CM

## 2019-10-28 PROCEDURE — 99213 OFFICE O/P EST LOW 20 MIN: CPT | Performed by: INTERNAL MEDICINE

## 2019-10-28 PROCEDURE — 93000 ELECTROCARDIOGRAM COMPLETE: CPT | Performed by: INTERNAL MEDICINE

## 2019-12-20 ENCOUNTER — TELEPHONE (OUTPATIENT)
Dept: CARDIOLOGY CLINIC | Age: 64
End: 2019-12-20

## 2019-12-30 ENCOUNTER — OFFICE VISIT (OUTPATIENT)
Dept: PULMONOLOGY | Age: 64
End: 2019-12-30

## 2019-12-30 VITALS
BODY MASS INDEX: 20.07 KG/M2 | TEMPERATURE: 96.4 F | DIASTOLIC BLOOD PRESSURE: 68 MMHG | SYSTOLIC BLOOD PRESSURE: 124 MMHG | HEIGHT: 70 IN | OXYGEN SATURATION: 99 % | HEART RATE: 62 BPM | WEIGHT: 140.2 LBS

## 2019-12-30 DIAGNOSIS — J43.9 PULMONARY EMPHYSEMA, UNSPECIFIED EMPHYSEMA TYPE (HCC): Primary | ICD-10-CM

## 2019-12-30 DIAGNOSIS — R06.02 SOB (SHORTNESS OF BREATH) ON EXERTION: ICD-10-CM

## 2019-12-30 DIAGNOSIS — J30.89 ALLERGIC RHINITIS DUE TO OTHER ALLERGIC TRIGGER, UNSPECIFIED SEASONALITY: ICD-10-CM

## 2019-12-30 DIAGNOSIS — R06.2 WHEEZING: ICD-10-CM

## 2019-12-30 PROCEDURE — 99213 OFFICE O/P EST LOW 20 MIN: CPT | Performed by: NURSE PRACTITIONER

## 2019-12-30 ASSESSMENT — ENCOUNTER SYMPTOMS
COUGH: 0
EYES NEGATIVE: 1
CHEST TIGHTNESS: 0
DIARRHEA: 0
RHINORRHEA: 1
WHEEZING: 0
NAUSEA: 0
BACK PAIN: 0
STRIDOR: 0
SHORTNESS OF BREATH: 1

## 2020-01-03 ENCOUNTER — HOSPITAL ENCOUNTER (OUTPATIENT)
Dept: CT IMAGING | Age: 65
Discharge: HOME | End: 2020-01-03

## 2020-01-03 LAB — POC CREATININE WHOLE BLOOD: 0.8 MG/DL (ref 0.5–1.2)

## 2020-01-03 PROCEDURE — 6360000004 HC RX CONTRAST MEDICATION: Performed by: INTERNAL MEDICINE

## 2020-01-03 PROCEDURE — 82565 ASSAY OF CREATININE: CPT

## 2020-01-03 PROCEDURE — 71275 CT ANGIOGRAPHY CHEST: CPT

## 2020-01-03 RX ADMIN — IOPAMIDOL 90 ML: 755 INJECTION, SOLUTION INTRAVENOUS at 13:57

## 2020-04-21 RX ORDER — METOPROLOL SUCCINATE 25 MG/1
TABLET, EXTENDED RELEASE ORAL
Qty: 30 TABLET | Refills: 2 | Status: SHIPPED | OUTPATIENT
Start: 2020-04-21 | End: 2020-05-14

## 2020-04-28 ENCOUNTER — TELEPHONE (OUTPATIENT)
Dept: INTERNAL MEDICINE CLINIC | Age: 65
End: 2020-04-28

## 2020-05-14 RX ORDER — METOPROLOL SUCCINATE 25 MG/1
TABLET, EXTENDED RELEASE ORAL
Qty: 30 TABLET | Refills: 1 | Status: SHIPPED | OUTPATIENT
Start: 2020-05-14 | End: 2020-06-25 | Stop reason: SDUPTHER

## 2020-06-13 ENCOUNTER — HOSPITAL ENCOUNTER (EMERGENCY)
Age: 65
Discharge: HOME | End: 2020-06-13
Attending: EMERGENCY MEDICINE

## 2020-06-13 VITALS
RESPIRATION RATE: 16 BRPM | OXYGEN SATURATION: 100 % | SYSTOLIC BLOOD PRESSURE: 137 MMHG | WEIGHT: 138 LBS | DIASTOLIC BLOOD PRESSURE: 63 MMHG | TEMPERATURE: 97.3 F | BODY MASS INDEX: 19.8 KG/M2 | HEART RATE: 57 BPM

## 2020-06-13 PROCEDURE — 99213 OFFICE O/P EST LOW 20 MIN: CPT | Performed by: EMERGENCY MEDICINE

## 2020-06-13 PROCEDURE — 69209 REMOVE IMPACTED EAR WAX UNI: CPT

## 2020-06-13 PROCEDURE — 99212 OFFICE O/P EST SF 10 MIN: CPT

## 2020-06-13 ASSESSMENT — ENCOUNTER SYMPTOMS
CONSTIPATION: 0
ABDOMINAL PAIN: 0
EYE DISCHARGE: 0
CHOKING: 0
SINUS PRESSURE: 0
STRIDOR: 0
VOICE CHANGE: 0
EYE REDNESS: 0
FACIAL SWELLING: 0
VOMITING: 0
COUGH: 0
WHEEZING: 0
EYE PAIN: 0
DIARRHEA: 0
BLOOD IN STOOL: 0
SORE THROAT: 0
NAUSEA: 0
BACK PAIN: 0
TROUBLE SWALLOWING: 0
SHORTNESS OF BREATH: 0

## 2020-06-13 NOTE — ED PROVIDER NOTES
Diagnosis Date    Aortic aneurysm, thoracic (HCC)     dilatation    GERD (gastroesophageal reflux disease)     Hyperlipidemia     Hypertension     Mitral valve prolapse     Pneumonia     SOB (shortness of breath)     Tricuspid regurgitation     mild       SURGICAL HISTORY     Patient  has a past surgical history that includes Cardiac catheterization (3/4/2010); knee surgery; Tonsillectomy; and  section. CURRENT MEDICATIONS       Discharge Medication List as of 2020 11:41 AM      CONTINUE these medications which have NOT CHANGED    Details   DM-Phenylephrine-Acetaminophen (MUCINEX SINUS-MAX MEGAN & PAIN PO) Take 2 tablets by mouthHistorical Med      metoprolol succinate (TOPROL XL) 25 MG extended release tablet take 1 tablet by mouth once daily, Disp-30 tablet, R-1Normal      aspirin 81 MG chewable tablet Take 81 mg by mouth daily Takes occas. ibuprofen (ADVIL;MOTRIN) 200 MG tablet Take 200 mg by mouth every 6 hours as needed for Pain. ALLERGIES     Patient is is allergic to ciprofloxacin. FAMILY HISTORY     Patient'sfamily history includes Cancer in her mother; Dementia in her mother; Heart Disease in her paternal grandfather; Heart Disease (age of onset: 80) in her father; High Blood Pressure in her mother. SOCIAL HISTORY     Patient  reports that she has never smoked. She has never used smokeless tobacco. She reports current alcohol use. She reports that she does not use drugs. PHYSICAL EXAM     ED TRIAGE VITALS  BP: 137/63, Temp: 97.3 °F (36.3 °C), Pulse: 57, Resp: 16, SpO2: 100 %  Physical Exam  Vitals signs and nursing note reviewed. Constitutional:       General: She is not in acute distress. Appearance: She is well-developed. She is not diaphoretic. Comments: Moist membranes, normal airway   HENT:      Head: Normocephalic and atraumatic.       Right Ear: External ear normal.      Left Ear: Tympanic membrane and external ear normal.      Ears:

## 2020-06-15 ENCOUNTER — CARE COORDINATION (OUTPATIENT)
Dept: CARE COORDINATION | Age: 65
End: 2020-06-15

## 2020-06-15 NOTE — CARE COORDINATION
Patient contacted regarding recent visit for viral symptoms. This [facility] contacted the patient by telephone to perform post discharge call. Verified name and  with patient as identifiers. Provided introduction to self, and reason for call due to viral symptoms of infection and/or exposure to COVID-19. Patient presented to emergency department/flu clinic with complaints of viral symptoms/exposure to COVID. Patient reports symptoms are improving. Due to no new or worsening symptoms the RN CTN/ACM was not notified for escalation. Discussed exposure protocols and quarantine with CDC Guidelines What To Do If You Are Sick    Patient was given an opportunity for questions and concerns. Stay home except to get medical care    Separate yourself from other people and animals in your home    Call ahead before visiting your doctor    Wear a facemask    Cover your coughs and sneezes    Clean your hands often    Avoid sharing personal household items    Clean all high-touch surfaces everyday    Monitor your symptoms  Seek prompt medical attention if your illness is worsening (e.g., difficulty breathing). Before seeking care, call your healthcare provider and tell them that you have, or are being evaluated for, COVID-19. Put on a facemask before you enter the facility. These steps will help the healthcare provider's office to keep other people in the office or waiting room from getting infected or exposed. Ask your healthcare provider to call the local or Psychiatric hospital health department. Persons who are placed under If you have a medical emergency and need to call 911, notify the dispatch personnel that you have, or are being evaluated for COVID-19. If possible, put on a facemask before emergency medical services arrive. The patient agrees to contact the [facility] exposure line [phone], local health department [place] Department of Health: ([phone]) and PCP office for questions related to their healthcare.

## 2020-06-18 ENCOUNTER — TELEPHONE (OUTPATIENT)
Dept: INTERNAL MEDICINE CLINIC | Age: 65
End: 2020-06-18

## 2020-06-25 ENCOUNTER — OFFICE VISIT (OUTPATIENT)
Dept: CARDIOLOGY CLINIC | Age: 65
End: 2020-06-25

## 2020-06-25 VITALS
HEIGHT: 70 IN | DIASTOLIC BLOOD PRESSURE: 62 MMHG | BODY MASS INDEX: 19.84 KG/M2 | WEIGHT: 138.6 LBS | SYSTOLIC BLOOD PRESSURE: 128 MMHG | HEART RATE: 80 BPM

## 2020-06-25 PROCEDURE — 99213 OFFICE O/P EST LOW 20 MIN: CPT | Performed by: INTERNAL MEDICINE

## 2020-06-25 RX ORDER — METOPROLOL SUCCINATE 25 MG/1
TABLET, EXTENDED RELEASE ORAL
Qty: 30 TABLET | Refills: 11 | Status: SHIPPED | OUTPATIENT
Start: 2020-06-25 | End: 2021-07-02 | Stop reason: SDUPTHER

## 2020-06-25 NOTE — PROGRESS NOTES
Laterality Date    CARDIAC CATHETERIZATION  3/4/2010     SECTION      KNEE SURGERY      right knee    TONSILLECTOMY         Review of Systems   Constitutional: Negative for chills and fever  HENT: Negative for congestion, sinus pressure, sneezing and sore throat. Eyes: Negative for pain, discharge, redness and itching. Respiratory: Negative for apnea, cough  Gastrointestinal: Negative for blood in stool, constipation, diarrhea   Endocrine: Negative for cold intolerance, heat intolerance, polydipsia. Genitourinary: Negative for dysuria, enuresis, flank pain and hematuria. Musculoskeletal: Negative for arthralgias, joint swelling and neck pain. Neurological: Negative for numbness and headaches. Psychiatric/Behavioral: Negative for agitation, confusion, decreased concentration and dysphoric mood. Objective:     /62   Pulse 80   Ht 5' 10\" (1.778 m)   Wt 138 lb 9.6 oz (62.9 kg)   BMI 19.89 kg/m²     Wt Readings from Last 3 Encounters:   20 138 lb 9.6 oz (62.9 kg)   20 138 lb (62.6 kg)   19 140 lb 3.2 oz (63.6 kg)     BP Readings from Last 3 Encounters:   20 128/62   20 137/63   19 124/68       Nursing note and vitals reviewed. Physical Exam   Constitutional: Oriented to person, place, and time. Appears well-developed and well-nourished. HENT:   Head: Normocephalic and atraumatic. Eyes: EOM are normal. Pupils are equal, round, and reactive to light. Neck: Normal range of motion. Neck supple. No JVD present. Cardiovascular: Normal rate, regular rhythm, normal heart sounds and intact distal pulses. No murmur heard. Pulmonary/Chest: Effort normal and breath sounds normal. No respiratory distress. No wheezes. No rales. Abdominal: Soft. Bowel sounds are normal. No distension. There is no tenderness. Musculoskeletal: Normal range of motion. No edema. Neurological: Alert and oriented to person, place, and time.  No cranial nerve deficit. Coordination normal.   Skin: Skin is warm and dry. Psychiatric: Normal mood and affect.        No results found for: CKTOTAL, CKMB, CKMBINDEX    Lab Results   Component Value Date    WBC 4.1 08/08/2017    RBC 4.42 08/08/2017    HGB 13.6 08/08/2017    HCT 40.0 08/08/2017    MCV 90.6 08/08/2017    MCH 30.7 08/08/2017    MCHC 33.9 08/08/2017    RDW 13.6 08/08/2017     08/08/2017    MPV 8.5 08/08/2017       Lab Results   Component Value Date     08/08/2017    K 4.2 08/08/2017     08/08/2017    CO2 27 08/08/2017    BUN 17 08/08/2017    LABALBU 4.7 11/19/2015    CREATININE 0.7 08/08/2017    CALCIUM 9.7 08/08/2017    LABGLOM 85 08/08/2017    GLUCOSE 101 08/08/2017       Lab Results   Component Value Date    ALKPHOS 47 11/19/2015    ALT 18 11/19/2015    AST 24 11/19/2015    PROT 7.2 11/19/2015    BILITOT 0.5 11/19/2015    LABALBU 4.7 11/19/2015       No results found for: MG    No results found for: INR, PROTIME      No results found for: LABA1C    Lab Results   Component Value Date    TRIG 62 02/04/2013    HDL 84 02/04/2013    LDLCALC 113 02/04/2013       Lab Results   Component Value Date    TSH 1.520 05/09/2017         Testing Reviewed:      I have individually reviewed the cardiac test below:    ECHO:   Results for orders placed during the hospital encounter of 04/26/19   Echo 2D w doppler w color complete    Narrative Transthoracic Echocardiography Report (TTE)     Demographics      Patient Name   [employer]           Gender              Female                  Niki Hermosillo      MR #           [id number]          Race                                                        Ethnicity      Account #      [de-identified]          Room Number      Accession      [id number]          Date of Study       04/26/2019   Number      Date of Birth  1955         Referring Physician MD Tripp Kamara MD      Age            61 year(s)         Lynnae Jeans, [facility]                                        Interpreting        Chris Hodge MD                                     Physician           Reina Mayorga MD     Procedure    Type of Study      TTE procedure:ECHOCARDIOGRAM COMPLETE 2D W DOPPLER W COLOR. Procedure Date  Date: 04/26/2019 Start: 08:46 AM    Study Location: Echo Lab  Technical Quality: Adequate visualization    Indications:Orthopnea. Additional Medical History:Shortness of breath, Mitral valve prolapse,  Hypertension, Hyperlipidemia, GERD, Thoracic aortic aneurysm, SVT    Patient Status: Routine    Height: 68.9 inches Weight: 138 pounds BSA: 1.76 m^2 BMI: 20.44 kg/m^2    BP: 110/64 mmHg     Conclusions      Summary   Ejection fraction is visually estimated at 60%. Overall left ventricular function is normal.      Signature      ----------------------------------------------------------------   Electronically signed by Reina Mayorga MD (Interpreting   physician) on 04/26/2019 at 06:29 PM   ----------------------------------------------------------------      Findings      Mitral Valve   Trace mitral regurgitation is present. Aortic Valve   The aortic valve was trileaflet with normal thickness and cuspal   separation. DOPPLER: Transaortic velocity was within the normal range with   no evidence of aortic stenosis. There was no evidence of aortic   regurgitation. Tricuspid Valve   The tricuspid valve structure was normal with normal leaflet separation. DOPPLER: There was no evidence of tricuspid stenosis. There was no   evidence of tricuspid regurgitation. Pulmonic Valve   The pulmonic valve was not well visualized . Trivial pulmonic regurgitation visualized. Left Atrium   Left atrial size was normal.      Left Ventricle   Ejection fraction is visually estimated at 60%.    Overall left ventricular function is normal.      Right Atrium   Right atrial size was normal. Right Ventricle   The right ventricular size was normal with normal systolic function and   wall thickness. Pericardial Effusion   The pericardium was normal in appearance with no evidence of a pericardial   effusion. Pleural Effusion   No evidence of pleural effusion. Aorta / Great Vessels   -Aortic root dimension within normal limits.   -The Pulmonary artery is within normal limits. -IVC size is within normal limits with normal respiratory phasic changes.      M-Mode/2D Measurements & Calculations      LV Diastolic    LV Systolic Dimension: 3  AV Cusp Separation: 2.1 cmLA   Dimension: 4.9  cm                        Dimension: 2.7 cmAO Root   cm              LV Volume Diastolic: 791  Dimension: 2.9 cmLA Area: 24.8   LV FS:38.8 %    ml                        cm^2   LV PW           LV Volume Systolic: 35 ml   Diastolic: 0.8  LV EDV/LV EDV Index: 113   cm              ml/64 m^2LV ESV/LV ESV   Septum          Index: 35 ml/20 m^2       RV Diastolic Dimension: 3 cm   Diastolic: 0.7  EF Calculated: 69 %   cm                                        LA/Aorta: 0.93                                                LA volume/Index: 74.5 ml /42m^2     Doppler Measurements & Calculations      MV Peak E-Wave: 62.9 cm/s  AV Peak Velocity: 119  LVOT Peak Velocity: 104   MV Peak A-Wave: 79.8 cm/s  cm/s                   cm/s   MV E/A Ratio: 0.79         AV Peak Gradient: 5.66 LVOT Peak Gradient: 4   MV Peak Gradient: 1.58     mmHg                   mmHg   mmHg                                                     TV Peak E-Wave: 63.6   MV Deceleration Time: 327                         cm/s   msec                                              TV Peak A-Wave: 50.7                              IVRT: 85 msec          cm/s      MV E' Septal Velocity: 6                          TV Peak Gradient: 1.62   cm/s                       AV DVI (Vmax):0.87     mmHg   MV A' Septal Velocity: 8.8                        TR Velocity:251

## 2020-09-28 ENCOUNTER — HOSPITAL ENCOUNTER (EMERGENCY)
Age: 65
Discharge: HOME | End: 2020-09-28
Payer: MEDICARE

## 2020-09-28 VITALS
WEIGHT: 141 LBS | DIASTOLIC BLOOD PRESSURE: 73 MMHG | BODY MASS INDEX: 20.19 KG/M2 | SYSTOLIC BLOOD PRESSURE: 138 MMHG | HEART RATE: 68 BPM | OXYGEN SATURATION: 100 % | HEIGHT: 70 IN | RESPIRATION RATE: 16 BRPM | TEMPERATURE: 96.8 F

## 2020-09-28 PROCEDURE — 99212 OFFICE O/P EST SF 10 MIN: CPT

## 2020-09-28 PROCEDURE — 99213 OFFICE O/P EST LOW 20 MIN: CPT | Performed by: NURSE PRACTITIONER

## 2020-09-28 RX ORDER — PREDNISONE 10 MG/1
TABLET ORAL
Qty: 14 TABLET | Refills: 0 | Status: SHIPPED | OUTPATIENT
Start: 2020-09-28 | End: 2020-10-22

## 2020-09-28 RX ORDER — CYCLOBENZAPRINE HCL 5 MG
5 TABLET ORAL 2 TIMES DAILY PRN
Qty: 10 TABLET | Refills: 0 | Status: SHIPPED | OUTPATIENT
Start: 2020-09-28 | End: 2020-10-03

## 2020-09-28 ASSESSMENT — ENCOUNTER SYMPTOMS
COLOR CHANGE: 0
WHEEZING: 0
STRIDOR: 0
CHEST TIGHTNESS: 0
COUGH: 0
SHORTNESS OF BREATH: 0

## 2020-09-28 ASSESSMENT — PAIN DESCRIPTION - ORIENTATION: ORIENTATION: LEFT

## 2020-09-28 ASSESSMENT — PAIN DESCRIPTION - LOCATION: LOCATION: LEG;FOOT

## 2020-09-28 ASSESSMENT — PAIN DESCRIPTION - DESCRIPTORS: DESCRIPTORS: ACHING

## 2020-09-28 ASSESSMENT — PAIN DESCRIPTION - FREQUENCY: FREQUENCY: CONTINUOUS

## 2020-09-28 ASSESSMENT — PAIN SCALES - GENERAL: PAINLEVEL_OUTOF10: 3

## 2020-09-28 ASSESSMENT — PAIN DESCRIPTION - PAIN TYPE: TYPE: ACUTE PAIN

## 2020-09-28 NOTE — ED PROVIDER NOTES
[facility]  Urgent Care Encounter       CHIEF COMPLAINT       Chief Complaint   Patient presents with    Leg Pain     lower left leg and foot pain        Nurses Notes reviewed and I agree except as noted in the HPI. HISTORY OF PRESENT ILLNESS   Nico Montenegro is a 72 y.o. female who presents     Patient is present in the urgent care today with concern for bump or lump to left shin. She states that she has noticed it for the past 3 to 4 weeks, denies noting any worsening or improvement. Denies any fevers. She denies any known injury. She denies being on any anticoagulants. She has been trying over-the-counter Tylenol and Motrin neither which seemed to be very helpful. She states that tenderness only seems to occur when flexing and extending left foot. Denies any tenderness when weightbearing. REVIEW OF SYSTEMS     Review of Systems   Constitutional: Negative for chills, fatigue and fever. Respiratory: Negative for cough, chest tightness, shortness of breath, wheezing and stridor. Musculoskeletal: Negative for arthralgias. Skin: Negative for color change, pallor, rash and wound. Neurological: Negative for dizziness, weakness, light-headedness, numbness and headaches. PAST MEDICAL HISTORY         Diagnosis Date    Aortic aneurysm, thoracic (HCC)     dilatation    GERD (gastroesophageal reflux disease)     Hyperlipidemia     Hypertension     Mitral valve prolapse     Pneumonia     SOB (shortness of breath)     Tricuspid regurgitation     mild       SURGICALHISTORY     Patient  has a past surgical history that includes Cardiac catheterization (3/4/2010); knee surgery; Tonsillectomy; and  section.     CURRENT MEDICATIONS       Discharge Medication List as of 2020 12:16 PM      CONTINUE these medications which have NOT CHANGED    Details   metoprolol succinate (TOPROL XL) 25 MG extended release tablet take 1 tablet by mouth once daily, lateral joint line, no MCL, no LCL and no patellar tendon tenderness noted. Right lower leg: No edema. Left lower leg: No edema. Legs:    Skin:     General: Skin is warm. Findings: No bruising. Neurological:      General: No focal deficit present. Mental Status: She is alert and oriented to person, place, and time. Sensory: No sensory deficit. Psychiatric:         Mood and Affect: Mood normal.         Behavior: Behavior normal.         Thought Content: Thought content normal.         Judgment: Judgment normal.         DIAGNOSTIC RESULTS     Labs:No results found for this visit on 09/28/20. IMAGING:    No orders to display     URGENT CARE COURSE:     Vitals:    09/28/20 1137   BP: 138/73   Pulse: 68   Resp: 16   Temp: 96.8 °F (36 °C)   TempSrc: Temporal   SpO2: 100%   Weight: 141 lb (64 kg)   Height: 5' 10\" (1.778 m)       Medications - No data to display         PROCEDURES:  None    FINAL IMPRESSION      1. Muscle strain, lower leg, left, initial encounter          DISPOSITION/ PLAN   Patient is discharged home with prescription for prednisone as well as Flexeril for suspected muscle spasm or strain from possible overuse. Patient should follow-up with her primary care provider within 3-4 days if prescriptions do not seem to be helping with bump or tenderness. Patient declines need for x-ray, as she states that she has been able to bear weight to leg without any discomfort, therefore she is not concerned for injury to bone.         PATIENT REFERRED TO:  Tino Santana MD  [facility] [address] / [place] [address]      DISCHARGE MEDICATIONS:  Discharge Medication List as of 9/28/2020 12:16 PM      START taking these medications    Details   predniSONE (DELTASONE) 10 MG tablet 40 mg for days 1-2, 20 mg for days 3-4, 10 mg for days 5-6, Disp-14 tablet,R-0Print      cyclobenzaprine (FLEXERIL) 5 MG tablet Take 1 tablet by mouth 2 times daily as needed for Muscle spasms, Disp-10 tablet,R-0Print             Discharge Medication List as of 9/28/2020 12:16 PM          Discharge Medication List as of 9/28/2020 12:16 PM          Gladys Schwab, APRN - NP    (Please note that portions of this note were completed with a voice recognition program. Efforts were made to edit the dictations but occasionally words are mis-transcribed.)         AMAURY Daugherty NP  09/28/20 1289

## 2020-09-28 NOTE — ED TRIAGE NOTES
Pt ambulatory into [facility] with c/o left lower leg and foot pain for the past three weeks. Pt denies any known [place]. Lump noted to medial lower left leg and bruising noted to foot. Pt states pain 3.

## 2020-09-28 NOTE — ED NOTES
Pt verbalized discharge instructions. Pt informed to go to ER if develop chest pain, shortness of breath or abdominal pain. Pt ambulatory out in stable condition. Assessment unchanged.        Alia Hutchins RN  09/28/20 [id number]

## 2020-09-30 ENCOUNTER — TELEPHONE (OUTPATIENT)
Dept: INTERNAL MEDICINE CLINIC | Age: 65
End: 2020-09-30

## 2020-10-22 ENCOUNTER — OFFICE VISIT (OUTPATIENT)
Dept: INTERNAL MEDICINE CLINIC | Age: 65
End: 2020-10-22
Payer: MEDICARE

## 2020-10-22 VITALS
HEIGHT: 70 IN | BODY MASS INDEX: 20.47 KG/M2 | DIASTOLIC BLOOD PRESSURE: 58 MMHG | WEIGHT: 143 LBS | SYSTOLIC BLOOD PRESSURE: 116 MMHG | TEMPERATURE: 97.2 F | HEART RATE: 62 BPM

## 2020-10-22 PROBLEM — J43.9 PULMONARY EMPHYSEMA (HCC): Status: ACTIVE | Noted: 2020-10-22

## 2020-10-22 PROCEDURE — 1036F TOBACCO NON-USER: CPT | Performed by: INTERNAL MEDICINE

## 2020-10-22 PROCEDURE — G8427 DOCREV CUR MEDS BY ELIG CLIN: HCPCS | Performed by: INTERNAL MEDICINE

## 2020-10-22 PROCEDURE — 1090F PRES/ABSN URINE INCON ASSESS: CPT | Performed by: INTERNAL MEDICINE

## 2020-10-22 PROCEDURE — 3017F COLORECTAL CA SCREEN DOC REV: CPT | Performed by: INTERNAL MEDICINE

## 2020-10-22 PROCEDURE — 4040F PNEUMOC VAC/ADMIN/RCVD: CPT | Performed by: INTERNAL MEDICINE

## 2020-10-22 PROCEDURE — 99213 OFFICE O/P EST LOW 20 MIN: CPT | Performed by: INTERNAL MEDICINE

## 2020-10-22 PROCEDURE — G8400 PT W/DXA NO RESULTS DOC: HCPCS | Performed by: INTERNAL MEDICINE

## 2020-10-22 PROCEDURE — 1123F ACP DISCUSS/DSCN MKR DOCD: CPT | Performed by: INTERNAL MEDICINE

## 2020-10-22 PROCEDURE — G8420 CALC BMI NORM PARAMETERS: HCPCS | Performed by: INTERNAL MEDICINE

## 2020-10-22 PROCEDURE — G8484 FLU IMMUNIZE NO ADMIN: HCPCS | Performed by: INTERNAL MEDICINE

## 2020-10-22 ASSESSMENT — PATIENT HEALTH QUESTIONNAIRE - PHQ9
SUM OF ALL RESPONSES TO PHQ9 QUESTIONS 1 & 2: 0
SUM OF ALL RESPONSES TO PHQ QUESTIONS 1-9: 0
1. LITTLE INTEREST OR PLEASURE IN DOING THINGS: 0
2. FEELING DOWN, DEPRESSED OR HOPELESS: 0

## 2020-10-22 NOTE — PROGRESS NOTES
[facility]  PHYSICIANS [place] [address] [place] [address], [place]  Dept: [phone]  Dept Fax: [phone]      Chief Complaint   Patient presents with   [facility]       Patient presents for a checkup  I last saw her 8/17  Patient has not had any admissions to the hospital  since the last visit here. Pertinent past history reviewed and summarized below    She is followed by cardiology for a thoracic aortic aneurysm. Recent CT of the chest showed fibro-emphysematous lungs. Cardiology set up PFTs and a pulmonary consult  She was told by pulmonary that she has COPD although she never smoked  Her PFTs were normal  She thinks she needs some blood work  She does complain of a bump on her left ankle area she has had it was black and blue all the way down to her foot  Past Medical History:   Diagnosis Date    Aortic aneurysm, thoracic (HCC)     dilatation    GERD (gastroesophageal reflux disease)     Hyperlipidemia     Hypertension     Mitral valve prolapse     Pneumonia     Pulmonary emphysema ([address] 10/22/2020    SOB (shortness of breath)     Tricuspid regurgitation     mild       Current Outpatient Medications   Medication Sig Dispense Refill    metoprolol succinate (TOPROL XL) 25 MG extended release tablet take 1 tablet by mouth once daily 30 tablet 11    DM-Phenylephrine-Acetaminophen (MUCINEX SINUS-MAX MEGAN & PAIN PO) Take 2 tablets by mouth      ibuprofen (ADVIL;MOTRIN) 200 MG tablet Take 400 mg by mouth every 6 hours as needed for Pain       aspirin 81 MG chewable tablet Take 81 mg by mouth daily Takes occas. No current facility-administered medications for this visit.         Review of Systems - History obtained from the patient  General ROS: negative  Psychological ROS: positive for - anxiety  Respiratory ROS: positive for - shortness of breath  Cardiovascular ROS: negative for - chest pain or loss of consciousness  Gastrointestinal ROS: no abdominal pain, change in bowel habits, or black or bloody stools  Genito-Urinary ROS: no dysuria, trouble voiding, or hematuria  Musculoskeletal ROS: negative  Neurological ROS: no TIA or stroke symptoms  Dermatological ROS: negative  Blood pressure (!) 116/58, pulse 62, temperature 97.2 °F (36.2 °C), height 5' 10\" (1.778 m), weight 143 lb (64.9 kg). Physical Examination: General appearance - alert, well appearing, and in no distress  HEENT- throat clear. Sinuses nontender  Neck - supple, no significant adenopathy  Lymphatics - no palpable lymphadenopathy, no hepatosplenomegaly  Heart - normal rate, regular rhythm, normal S1, S2, no murmurs, rubs, clicks or gallops  . Lungs-good air flow bilaterally, no wheezes  Abdomen - soft, nontender, nondistended, no masses or organomegaly  Extremities - peripheral pulses normal, no pedal edema, no clubbing or cyanosis, hard mass medial aspect of the left lower leg somewhat tender  Skin - normal coloration and turgor, no rashes, no suspicious skin lesions noted          CT of chest     Impression       1. Redemonstration of a 4.1 cm ascending aortic aneurysm which is unchanged since the prior examination. 2. Emphysematous changes of the lungs with multiple stable subcentimeter pulmonary nodules which are unchanged and likely benign.            Diagnosis Orders   1. Mass of left lower leg  XR ANKLE LEFT (2 VIEWS)   2. Hyperlipidemia, unspecified hyperlipidemia type  Lipid Panel    CBC   3. Fatigue, unspecified type  TSH   4. Thoracic aortic aneurysm without rupture ([address]     5. SVT (supraventricular tachycardia) (HCC)     6. Pulmonary nodule           She was worked up by Dr. Asmita Glasgow for pulmonary nodules a couple of years ago. He reassured her these were benign.   She does not have COPD  We will check baseline labs including lipids CMP CBC TSH  Tentatively see her in 6 months  Dr. Henderson Staff follows her thoracic aortic aneurysm  We will check an x-ray of her left lower leg

## 2020-10-29 ENCOUNTER — HOSPITAL ENCOUNTER (OUTPATIENT)
Age: 65
Discharge: HOME | End: 2020-10-29
Payer: MEDICARE

## 2020-10-29 ENCOUNTER — HOSPITAL ENCOUNTER (OUTPATIENT)
Dept: GENERAL RADIOLOGY | Age: 65
Discharge: HOME | End: 2020-10-29
Payer: MEDICARE

## 2020-10-29 PROCEDURE — 73600 X-RAY EXAM OF ANKLE: CPT

## 2020-11-06 ENCOUNTER — TELEPHONE (OUTPATIENT)
Dept: INTERNAL MEDICINE CLINIC | Age: 65
End: 2020-11-06

## 2020-11-06 NOTE — TELEPHONE ENCOUNTER
VO per Dr. Kati Tang for Xanax 1mg nightly PRN for 30 days qty 30. LPN called in script to [facility] [address] in [place].

## 2020-11-06 NOTE — TELEPHONE ENCOUNTER
LPN called patient to let her know her Xanax script was called into [facility] on [address] VM full unable to leave a message.

## 2020-11-07 RX ORDER — ALPRAZOLAM 1 MG/1
1 TABLET ORAL NIGHTLY PRN
Qty: 30 TABLET | Refills: 0 | Status: SHIPPED | OUTPATIENT
Start: 2020-11-07 | End: 2020-12-07

## 2021-01-20 ENCOUNTER — TELEPHONE (OUTPATIENT)
Dept: CARDIOLOGY CLINIC | Age: 66
End: 2021-01-20

## 2021-01-20 DIAGNOSIS — I47.1 SVT (SUPRAVENTRICULAR TACHYCARDIA) (HCC): ICD-10-CM

## 2021-01-20 DIAGNOSIS — I10 ESSENTIAL HYPERTENSION: ICD-10-CM

## 2021-01-20 DIAGNOSIS — I71.20 THORACIC AORTIC ANEURYSM WITHOUT RUPTURE: Primary | ICD-10-CM

## 2021-01-20 NOTE — TELEPHONE ENCOUNTER
Patient no showed MRA of chest on Jan.4.   LM for patient to call us to get rescheduled if she still wants to do it

## 2021-02-01 ENCOUNTER — HOSPITAL ENCOUNTER (OUTPATIENT)
Dept: MRI IMAGING | Age: 66
Discharge: HOME | End: 2021-02-01
Payer: MEDICARE

## 2021-02-01 ENCOUNTER — HOSPITAL ENCOUNTER (OUTPATIENT)
Dept: CT IMAGING | Age: 66
Discharge: HOME | End: 2021-02-01
Payer: MEDICARE

## 2021-02-01 DIAGNOSIS — I71.20 THORACIC AORTIC ANEURYSM WITHOUT RUPTURE: ICD-10-CM

## 2021-02-01 DIAGNOSIS — I10 ESSENTIAL HYPERTENSION: ICD-10-CM

## 2021-02-01 DIAGNOSIS — E78.5 HYPERLIPIDEMIA, UNSPECIFIED HYPERLIPIDEMIA TYPE: ICD-10-CM

## 2021-02-01 LAB — POC CREATININE WHOLE BLOOD: 0.8 MG/DL (ref 0.5–1.2)

## 2021-02-01 PROCEDURE — 82565 ASSAY OF CREATININE: CPT

## 2021-02-01 PROCEDURE — 6360000004 HC RX CONTRAST MEDICATION: Performed by: INTERNAL MEDICINE

## 2021-02-01 PROCEDURE — 71275 CT ANGIOGRAPHY CHEST: CPT

## 2021-02-01 RX ADMIN — IOPAMIDOL 80 ML: 755 INJECTION, SOLUTION INTRAVENOUS at 13:21

## 2021-02-11 ENCOUNTER — OFFICE VISIT (OUTPATIENT)
Dept: PULMONOLOGY | Age: 66
End: 2021-02-11
Payer: MEDICARE

## 2021-02-11 VITALS
TEMPERATURE: 96.6 F | BODY MASS INDEX: 20.19 KG/M2 | HEIGHT: 70 IN | OXYGEN SATURATION: 98 % | SYSTOLIC BLOOD PRESSURE: 122 MMHG | DIASTOLIC BLOOD PRESSURE: 74 MMHG | HEART RATE: 65 BPM | WEIGHT: 141 LBS

## 2021-02-11 DIAGNOSIS — R06.02 SOB (SHORTNESS OF BREATH): ICD-10-CM

## 2021-02-11 DIAGNOSIS — J43.9 PULMONARY EMPHYSEMA, UNSPECIFIED EMPHYSEMA TYPE (HCC): ICD-10-CM

## 2021-02-11 DIAGNOSIS — J45.20 MILD INTERMITTENT ASTHMA WITHOUT COMPLICATION: Primary | ICD-10-CM

## 2021-02-11 PROCEDURE — 95012 NITRIC OXIDE EXP GAS DETER: CPT | Performed by: NURSE PRACTITIONER

## 2021-02-11 PROCEDURE — 1090F PRES/ABSN URINE INCON ASSESS: CPT | Performed by: NURSE PRACTITIONER

## 2021-02-11 PROCEDURE — G8400 PT W/DXA NO RESULTS DOC: HCPCS | Performed by: NURSE PRACTITIONER

## 2021-02-11 PROCEDURE — 99214 OFFICE O/P EST MOD 30 MIN: CPT | Performed by: NURSE PRACTITIONER

## 2021-02-11 PROCEDURE — G8420 CALC BMI NORM PARAMETERS: HCPCS | Performed by: NURSE PRACTITIONER

## 2021-02-11 PROCEDURE — 3017F COLORECTAL CA SCREEN DOC REV: CPT | Performed by: NURSE PRACTITIONER

## 2021-02-11 PROCEDURE — G8484 FLU IMMUNIZE NO ADMIN: HCPCS | Performed by: NURSE PRACTITIONER

## 2021-02-11 PROCEDURE — 3023F SPIROM DOC REV: CPT | Performed by: NURSE PRACTITIONER

## 2021-02-11 PROCEDURE — 1036F TOBACCO NON-USER: CPT | Performed by: NURSE PRACTITIONER

## 2021-02-11 PROCEDURE — G8427 DOCREV CUR MEDS BY ELIG CLIN: HCPCS | Performed by: NURSE PRACTITIONER

## 2021-02-11 PROCEDURE — G8926 SPIRO NO PERF OR DOC: HCPCS | Performed by: NURSE PRACTITIONER

## 2021-02-11 PROCEDURE — 1123F ACP DISCUSS/DSCN MKR DOCD: CPT | Performed by: NURSE PRACTITIONER

## 2021-02-11 PROCEDURE — 4040F PNEUMOC VAC/ADMIN/RCVD: CPT | Performed by: NURSE PRACTITIONER

## 2021-02-11 ASSESSMENT — ENCOUNTER SYMPTOMS
COUGH: 0
WHEEZING: 0
RHINORRHEA: 1
ABDOMINAL PAIN: 0
NAUSEA: 0
EYES NEGATIVE: 1
DIARRHEA: 0
SHORTNESS OF BREATH: 0
VOMITING: 0

## 2021-02-11 NOTE — PROGRESS NOTES
[facility]     Patient: Rodolfo Santana, 72 y.o.   : 1955    Pt of Dr. Chevy Willams   Patient presents with    Follow-up     Emphysema 13 month pulmonary follow up with no testing         HPI  Sanya Rowley is here for 1 year follow up for Emphysema   History of pulmonary nodules on CT imaging - declined CTD w/u with labs   Normal PFT 2019- declined MCCT in past due to self pay  Now on Medicare insurance. Not on inhalers. Has been on albuterol in past for bronchitis  C/o chronic sinus congestion and drainage. History of sinus surgery about 20 years ago. Has feeling of SOB that wakes her up about 1x every 6 months, no current SOB, no SOB during the day  Exercises regularly with no SOB/ wheezing  On flonase in past. Not currently using   C/o pain to bilateral thumb joints. No swelling or redness. Family history of asthma: sister and her 2 children     Past Medical hx   PMH:  Past Medical History:   Diagnosis Date    Aortic aneurysm, thoracic (HCC)     dilatation    GERD (gastroesophageal reflux disease)     Hyperlipidemia     Hypertension     Mitral valve prolapse     Pneumonia     Pulmonary emphysema ([address] 10/22/2020    SOB (shortness of breath)     Tricuspid regurgitation     mild     SURGICAL HISTORY:  Past Surgical History:   Procedure Laterality Date    CARDIAC CATHETERIZATION  3/4/2010     SECTION      KNEE SURGERY      right knee    TONSILLECTOMY       SOCIAL HISTORY:  Social History     Tobacco Use    Smoking status: Never Smoker    Smokeless tobacco: Never Used   Substance Use Topics    Alcohol use:  Yes     Alcohol/week: 0.0 standard drinks     Comment: social    Drug use: No     ALLERGIES:  Allergies   Allergen Reactions    Ciprofloxacin Itching     FAMILY HISTORY:  Family History   Problem Relation Age of Onset    Heart Disease Father 80        cabg    High Blood Pressure Mother     Cancer Mother     Dementia Mother     Heart Disease Paternal Grandfather         x2     CURRENT MEDICATIONS:  Current Outpatient Medications   Medication Sig Dispense Refill    metoprolol succinate (TOPROL XL) 25 MG extended release tablet take 1 tablet by mouth once daily 30 tablet 11    aspirin 81 MG chewable tablet Take 81 mg by mouth daily Takes occas.  DM-Phenylephrine-Acetaminophen (MUCINEX SINUS-MAX MEGAN & PAIN PO) Take 2 tablets by mouth       No current facility-administered medications for this visit. Leonid ADKINS   Review of Systems   Constitutional: Negative for activity change, chills, fatigue, fever and unexpected weight change. HENT: Positive for congestion, postnasal drip and rhinorrhea. Eyes: Negative. Respiratory: Negative for cough, shortness of breath and wheezing. Cardiovascular: Negative for chest pain, palpitations and leg swelling. Gastrointestinal: Negative for abdominal pain, diarrhea, nausea and vomiting. Genitourinary: Negative. Musculoskeletal: Negative. Skin: Negative. Neurological: Negative. Hematological: Does not bruise/bleed easily. Psychiatric/Behavioral: Positive for sleep disturbance. Negative for suicidal ideas. Physical exam   /74 (Site: Left Upper Arm, Position: Sitting)   Pulse 65   Temp 96.6 °F (35.9 °C)   Ht 5' 10\" (1.778 m)   Wt 141 lb (64 kg)   SpO2 98% Comment: on RA  BMI 20.23 kg/m²      Wt Readings from Last 3 Encounters:   02/11/21 141 lb (64 kg)   10/22/20 143 lb (64.9 kg)   09/28/20 141 lb (64 kg)     Physical Exam  Vitals signs and nursing note reviewed. Constitutional:       General: She is not in acute distress. Appearance: She is well-developed. HENT:      Mouth/Throat:      Lips: Pink. Mouth: Mucous membranes are moist.      Pharynx: Oropharynx is clear. No oropharyngeal exudate or posterior oropharyngeal erythema.    Eyes:      Conjunctiva/sclera: Conjunctivae normal.   Neck: intact. There are no acute fractures.           Impression       1. Aneurysmal dilatation of the ascending aorta measuring 4.2 cm. This is unchanged when compared to the previous study. 2. Stable pulmonary nodules in the right lung.           **This report has been created using voice recognition software. It may contain minor errors which are inherent in voice recognition technology. **       Final report electronically signed by Dr Veronica Asif on 2/1/2021 2:16 PM         Fractional Exhaled Nitric Oxide Level today 6, indicating No / low Th2 driven airway inflammation. Based on this patient would not, likely benefit from corticosteroids. Electronically signed by AMAURY Newsome CNP     Assessment      Diagnosis Orders   1. Mild intermittent asthma without complication  Full PFT Study With Bronchodilator    COVID-19 Ambulatory   2. SOB (shortness of breath)  POCT Nitric Oxide   3.  Pulmonary emphysema, unspecified emphysema type ([facility]        Plan    -recommend continue routine exercise, maintain healthy weight   -pay attention to activities or exposures on days when having episodes at night.   -recommend PRN albuterol sulfate HFA to have on hand, she declines  -offered lab testing to r/o CTD , particularly RA with joint pains and lung nodules- she declines    Total time spent on encounter was 35 minutes    Will see Cali Fisher in: 1 year with Full PFT , call for sooner appt if any worsening     Electronically signed by AMAURY Newsome CNP on 2/11/2021 at 10:28 AM

## 2021-03-14 DIAGNOSIS — J06.9 UPPER RESPIRATORY TRACT INFECTION, UNSPECIFIED TYPE: Primary | ICD-10-CM

## 2021-03-14 RX ORDER — AZITHROMYCIN 250 MG/1
250 TABLET, FILM COATED ORAL SEE ADMIN INSTRUCTIONS
Qty: 6 TABLET | Refills: 0 | Status: SHIPPED | OUTPATIENT
Start: 2021-03-14 | End: 2021-03-19

## 2021-03-16 ENCOUNTER — OFFICE VISIT (OUTPATIENT)
Dept: INTERNAL MEDICINE CLINIC | Age: 66
End: 2021-03-16
Payer: MEDICARE

## 2021-03-16 ENCOUNTER — TELEPHONE (OUTPATIENT)
Dept: INTERNAL MEDICINE CLINIC | Age: 66
End: 2021-03-16

## 2021-03-16 ENCOUNTER — HOSPITAL ENCOUNTER (OUTPATIENT)
Age: 66
Setting detail: SPECIMEN
Discharge: HOME | End: 2021-03-16
Payer: MEDICARE

## 2021-03-16 ENCOUNTER — NURSE ONLY (OUTPATIENT)
Dept: LAB | Age: 66
End: 2021-03-16

## 2021-03-16 VITALS
BODY MASS INDEX: 20.04 KG/M2 | WEIGHT: 140 LBS | SYSTOLIC BLOOD PRESSURE: 139 MMHG | DIASTOLIC BLOOD PRESSURE: 95 MMHG | HEIGHT: 70 IN | HEART RATE: 75 BPM | TEMPERATURE: 97.8 F

## 2021-03-16 DIAGNOSIS — Z11.59 SCREENING FOR VIRAL DISEASE: ICD-10-CM

## 2021-03-16 DIAGNOSIS — J40 TRACHEOBRONCHITIS: Primary | ICD-10-CM

## 2021-03-16 DIAGNOSIS — D72.819 LEUKOPENIA, UNSPECIFIED TYPE: ICD-10-CM

## 2021-03-16 DIAGNOSIS — J40 TRACHEOBRONCHITIS: ICD-10-CM

## 2021-03-16 DIAGNOSIS — I71.20 THORACIC AORTIC ANEURYSM WITHOUT RUPTURE: ICD-10-CM

## 2021-03-16 LAB
ALBUMIN SERPL-MCNC: 4.6 G/DL (ref 3.5–5.1)
ALP BLD-CCNC: 44 U/L (ref 38–126)
ALT SERPL-CCNC: 24 U/L (ref 11–66)
ANION GAP SERPL CALCULATED.3IONS-SCNC: 11 MEQ/L (ref 8–16)
AST SERPL-CCNC: 28 U/L (ref 5–40)
BILIRUB SERPL-MCNC: 0.4 MG/DL (ref 0.3–1.2)
BUN BLDV-MCNC: 10 MG/DL (ref 7–22)
CALCIUM SERPL-MCNC: 9.8 MG/DL (ref 8.5–10.5)
CHLORIDE BLD-SCNC: 104 MEQ/L (ref 98–111)
CO2: 28 MEQ/L (ref 23–33)
CREAT SERPL-MCNC: 0.8 MG/DL (ref 0.4–1.2)
ERYTHROCYTE [DISTWIDTH] IN BLOOD BY AUTOMATED COUNT: 13.1 % (ref 11.5–14.5)
ERYTHROCYTE [DISTWIDTH] IN BLOOD BY AUTOMATED COUNT: 46.1 FL (ref 35–45)
GFR SERPL CREATININE-BSD FRML MDRD: 72 ML/MIN/1.73M2
GLUCOSE BLD-MCNC: 100 MG/DL (ref 70–108)
HCT VFR BLD CALC: 45 % (ref 37–47)
HEMOGLOBIN: 14.3 GM/DL (ref 12–16)
MCH RBC QN AUTO: 30.2 PG (ref 26–33)
MCHC RBC AUTO-ENTMCNC: 31.8 GM/DL (ref 32.2–35.5)
MCV RBC AUTO: 94.9 FL (ref 81–99)
PLATELET # BLD: 176 THOU/MM3 (ref 130–400)
PMV BLD AUTO: 10.6 FL (ref 9.4–12.4)
POTASSIUM SERPL-SCNC: 4.4 MEQ/L (ref 3.5–5.2)
RBC # BLD: 4.74 MILL/MM3 (ref 4.2–5.4)
SODIUM BLD-SCNC: 143 MEQ/L (ref 135–145)
TOTAL PROTEIN: 7.4 G/DL (ref 6.1–8)
WBC # BLD: 2 THOU/MM3 (ref 4.8–10.8)

## 2021-03-16 PROCEDURE — 1036F TOBACCO NON-USER: CPT | Performed by: INTERNAL MEDICINE

## 2021-03-16 PROCEDURE — G8427 DOCREV CUR MEDS BY ELIG CLIN: HCPCS | Performed by: INTERNAL MEDICINE

## 2021-03-16 PROCEDURE — G8420 CALC BMI NORM PARAMETERS: HCPCS | Performed by: INTERNAL MEDICINE

## 2021-03-16 PROCEDURE — 99213 OFFICE O/P EST LOW 20 MIN: CPT | Performed by: INTERNAL MEDICINE

## 2021-03-16 PROCEDURE — 1123F ACP DISCUSS/DSCN MKR DOCD: CPT | Performed by: INTERNAL MEDICINE

## 2021-03-16 PROCEDURE — G8484 FLU IMMUNIZE NO ADMIN: HCPCS | Performed by: INTERNAL MEDICINE

## 2021-03-16 PROCEDURE — 4040F PNEUMOC VAC/ADMIN/RCVD: CPT | Performed by: INTERNAL MEDICINE

## 2021-03-16 PROCEDURE — 3017F COLORECTAL CA SCREEN DOC REV: CPT | Performed by: INTERNAL MEDICINE

## 2021-03-16 PROCEDURE — G8400 PT W/DXA NO RESULTS DOC: HCPCS | Performed by: INTERNAL MEDICINE

## 2021-03-16 PROCEDURE — U0003 INFECTIOUS AGENT DETECTION BY NUCLEIC ACID (DNA OR RNA); SEVERE ACUTE RESPIRATORY SYNDROME CORONAVIRUS 2 (SARS-COV-2) (CORONAVIRUS DISEASE [COVID-19]), AMPLIFIED PROBE TECHNIQUE, MAKING USE OF HIGH THROUGHPUT TECHNOLOGIES AS DESCRIBED BY CMS-2020-01-R: HCPCS

## 2021-03-16 PROCEDURE — 1090F PRES/ABSN URINE INCON ASSESS: CPT | Performed by: INTERNAL MEDICINE

## 2021-03-16 NOTE — PROGRESS NOTES
[facility] PROFESSIONAL SERVS  [place] [address] [address]  [address] [facility], [facility]  Dept: [phone]  Dept Fax: [phone]      Chief Complaint   Patient presents with    Pharyngitis   weekago  Sinus sore throat  thurs cough congestion  z pack Sunday  Sneezing coughing no fever    Patient presents for evaluation of URI  I last saw her 8/17  Patient has not had any admissions to the hospital  since the last visit here. Pertinent past history reviewed and summarized below  She said about a week ago she developed sinus drainage and a sore throat several days ago she developed cough and congestion  She had called me and I sent her in a Z-Harpreet  She says she really feels no better she said yesterday she was sneezing constantly coughing denies any fever  No shortness of breath  She is followed by cardiology for a thoracic aortic aneurysm. Recent CT of the chest showed fibro-emphysematous lungs. Cardiology set up PFTs and a pulmonary consult  She was told by pulmonary that she has COPD although she never smoked  Her PFTs were normal    Past Medical History:   Diagnosis Date    Aortic aneurysm, thoracic (HCC)     dilatation    GERD (gastroesophageal reflux disease)     Hyperlipidemia     Hypertension     Mitral valve prolapse     Pneumonia     Pulmonary emphysema ([facility] [address] [address] 10/22/2020    SOB (shortness of breath)     Tricuspid regurgitation     mild       Current Outpatient Medications   Medication Sig Dispense Refill    azithromycin (ZITHROMAX) 250 MG tablet Take 1 tablet by mouth See Admin Instructions for 5 days 500mg on day 1 followed by 250mg on days 2 - 5 6 tablet 0    metoprolol succinate (TOPROL XL) 25 MG extended release tablet take 1 tablet by mouth once daily 30 tablet 11    DM-Phenylephrine-Acetaminophen (MUCINEX SINUS-MAX MEGAN & PAIN PO) Take 2 tablets by mouth      aspirin 81 MG chewable tablet Take 81 mg by mouth daily Takes occas.        No current facility-administered medications for this visit. Review of Systems - History obtained from the patient  General ROS: Tired, no energy  Psychological ROS: positive for - anxiety  Respiratory ROS: positive for -cough, congestion  Cardiovascular ROS: negative for - chest pain or loss of consciousness  Gastrointestinal ROS: no abdominal pain, change in bowel habits, or black or bloody stools  Genito-Urinary ROS: no dysuria, trouble voiding, or hematuria  Musculoskeletal ROS: Muscle aches  Neurological ROS: no TIA or stroke symptoms  Dermatological ROS: negative  Blood pressure (!) 139/95, pulse 75, temperature 97.8 °F (36.6 °C), height 5' 10\" (1.778 m), weight 140 lb (63.5 kg). Physical Examination: General appearance - alert, well appearing, and in no distress  HEENT- throat clear. Sinuses nontender  Neck - supple, no significant adenopathy  Lymphatics - no palpable lymphadenopathy, no hepatosplenomegaly  Heart - normal rate, regular rhythm, normal S1, S2, no murmurs, rubs, clicks or gallops  . Lungs-good air flow bilaterally, no wheezes  Abdomen - soft, nontender, nondistended, no masses or organomegaly  Extremities - peripheral pulses normal, no pedal edema, no clubbing or cyanosis,   Skin - normal coloration and turgor, no rashes, no suspicious skin lesions noted             Diagnosis Orders   1. Tracheobronchitis  CBC    Comprehensive Metabolic Panel    EVRHO-11, Antibody, Total   2. Screening for viral disease  COVID-19   3. Leukopenia, unspecified type  WBC   4.  Thoracic aortic aneurysm without rupture [facility])         Patient has really not responded to Zithromax  I think we have to rule out Covid  I am going to send her over for a test  We will check CBC CMP  She has a history of a borderline low white count in the past    Dr. Sathya Jaramillo follows her thoracic aortic aneurysm  We will check an x-ray of her left lower leg

## 2021-03-18 ENCOUNTER — HOSPITAL ENCOUNTER (EMERGENCY)
Age: 66
Discharge: HOME | End: 2021-03-18
Payer: MEDICARE

## 2021-03-18 ENCOUNTER — APPOINTMENT (OUTPATIENT)
Dept: GENERAL RADIOLOGY | Age: 66
End: 2021-03-18
Payer: MEDICARE

## 2021-03-18 VITALS
TEMPERATURE: 97.9 F | WEIGHT: 140 LBS | SYSTOLIC BLOOD PRESSURE: 169 MMHG | RESPIRATION RATE: 12 BRPM | BODY MASS INDEX: 20.04 KG/M2 | HEIGHT: 70 IN | HEART RATE: 91 BPM | OXYGEN SATURATION: 97 % | DIASTOLIC BLOOD PRESSURE: 89 MMHG

## 2021-03-18 DIAGNOSIS — J06.9 VIRAL URI: ICD-10-CM

## 2021-03-18 DIAGNOSIS — U07.1 COVID-19 VIRUS INFECTION: Primary | ICD-10-CM

## 2021-03-18 LAB
SARS-COV-2 ANTIBODY, TOTAL: NEGATIVE
SARS-COV-2: DETECTED

## 2021-03-18 PROCEDURE — 99281 EMR DPT VST MAYX REQ PHY/QHP: CPT

## 2021-03-18 PROCEDURE — 71045 X-RAY EXAM CHEST 1 VIEW: CPT

## 2021-03-18 RX ORDER — ALBUTEROL SULFATE 90 UG/1
2 AEROSOL, METERED RESPIRATORY (INHALATION) EVERY 4 HOURS PRN
Status: DISCONTINUED | OUTPATIENT
Start: 2021-03-18 | End: 2021-03-18 | Stop reason: HOSPADM

## 2021-03-19 ENCOUNTER — CARE COORDINATION (OUTPATIENT)
Dept: CARE COORDINATION | Age: 66
End: 2021-03-19

## 2021-03-19 ASSESSMENT — ENCOUNTER SYMPTOMS
WHEEZING: 1
COLOR CHANGE: 0
PHOTOPHOBIA: 0
SINUS PAIN: 0
CONSTIPATION: 0
ABDOMINAL PAIN: 0
BACK PAIN: 0
RHINORRHEA: 0
SORE THROAT: 0
NAUSEA: 0
DIARRHEA: 0
TROUBLE SWALLOWING: 0
COUGH: 0
SHORTNESS OF BREATH: 0
VOMITING: 0
CHEST TIGHTNESS: 0
SINUS PRESSURE: 0

## 2021-03-19 NOTE — ACP (ADVANCE CARE PLANNING)
Advance Care Planning   Healthcare Decision Maker:      Click here to complete Healthcare Decision Makers including selection of the Healthcare Decision Maker Relationship (ie \"Primary\").   Today we reviewed health care decision maker with pt

## 2021-03-19 NOTE — ED PROVIDER NOTES
[facility] COMPLAINT       Chief Complaint   Patient presents with    Wheezing       Nurses Notes reviewed and I agree except as noted in the HPI. HISTORY OF PRESENT ILLNESS    Tiff Ling is a 72 y.o. female who presents to the Emergency Department for the evaluation of wheezing. Patient experienced some fever, cough, shortness of breath, sore throat, headache over the past 6 days. Went to see PCP, had outpatient Covid swab completed on March 16, found out yesterday that she was positive. She contacted her PCP stating that she was having some increased wheezing. Was instructed to come to the emergency department for evaluation. She states that she actually feels better than she has in the past week however had concerns due to some audible wheezing at home. Has not received Covid vaccine. She does report some loss of sense of taste and smell, denies productive cough, denies shortness of breath. The HPI was provided by the patient. REVIEW OF SYSTEMS     Review of Systems   Constitutional: Negative for chills, diaphoresis, fatigue and fever. HENT: Negative for congestion, ear pain, nosebleeds, rhinorrhea, sinus pressure, sinus pain, sore throat and trouble swallowing. Eyes: Negative for photophobia. Respiratory: Positive for wheezing. Negative for cough, chest tightness and shortness of breath. Cardiovascular: Negative for chest pain and palpitations. Gastrointestinal: Negative for abdominal pain, constipation, diarrhea, nausea and vomiting. Endocrine: Negative for cold intolerance and heat intolerance. Genitourinary: Negative for difficulty urinating, dysuria, flank pain, hematuria, pelvic pain, vaginal bleeding, vaginal discharge and vaginal pain. Musculoskeletal: Negative for arthralgias, back pain, joint swelling, myalgias and neck stiffness. Skin: Negative for color change and wound. Neurological: Negative for dizziness, weakness, light-headedness, numbness and headaches. Psychiatric/Behavioral: Negative for agitation, behavioral problems, confusion, hallucinations, self-injury and suicidal ideas. The patient is not nervous/anxious. PAST MEDICAL HISTORY    has a past medical history of Aortic aneurysm, thoracic ([address] GERD (gastroesophageal reflux disease), Hyperlipidemia, Hypertension, Mitral valve prolapse, Pneumonia, Pulmonary emphysema (HCC), SOB (shortness of breath), and Tricuspid regurgitation. SURGICAL HISTORY      has a past surgical history that includes Cardiac catheterization (3/4/2010); knee surgery; Tonsillectomy; and  section. CURRENT MEDICATIONS       Discharge Medication List as of 3/18/2021  8:55 PM      CONTINUE these medications which have NOT CHANGED    Details   azithromycin (ZITHROMAX) 250 MG tablet Take 1 tablet by mouth See Admin Instructions for 5 days 500mg on day 1 followed by 250mg on days 2 - 5, Disp-6 tablet, R-0Normal      metoprolol succinate (TOPROL XL) 25 MG extended release tablet take 1 tablet by mouth once daily, Disp-30 tablet, R-11Normal      DM-Phenylephrine-Acetaminophen (MUCINEX SINUS-MAX MEGAN & PAIN PO) Take 2 tablets by mouthHistorical Med      aspirin 81 MG chewable tablet Take 81 mg by mouth daily Takes occas. ALLERGIES     is allergic to ciprofloxacin. FAMILY HISTORY     She indicated that her mother is alive. She indicated that her father is . She indicated that the status of her paternal grandfather is unknown.   family history includes Cancer in her mother; Dementia in her mother; Heart Disease in her paternal grandfather; Heart Disease (age of onset: 80) in her father; High Blood Pressure in her mother. SOCIAL HISTORY      reports that she has never smoked. She has never used smokeless tobacco. She reports current alcohol use. She reports that she does not use drugs.     PHYSICAL EXAM INITIAL VITALS:  height is 5' 10\" (1.778 m) and weight is 140 lb (63.5 kg). Her oral temperature is 97.9 °F (36.6 °C). Her blood pressure is 169/89 (abnormal) and her pulse is 91. Her respiration is 12 and oxygen saturation is 97%. Physical Exam  Vitals signs and nursing note reviewed. Constitutional:       General: She is awake. She is not in acute distress. Appearance: Normal appearance. She is well-developed and normal weight. She is not ill-appearing, toxic-appearing or diaphoretic. HENT:      Head: Normocephalic and atraumatic. Right Ear: Tympanic membrane normal.      Left Ear: Tympanic membrane normal.      Nose: Nose normal.      Mouth/Throat:      Mouth: Mucous membranes are moist.      Pharynx: Oropharynx is clear. Eyes:      Extraocular Movements: Extraocular movements intact. Pupils: Pupils are equal, round, and reactive to light. Neck:      Musculoskeletal: Normal range of motion and neck supple. No neck rigidity or muscular tenderness. Vascular: No carotid bruit. Cardiovascular:      Rate and Rhythm: Normal rate and regular rhythm. Pulses: Normal pulses. Heart sounds: Normal heart sounds, S1 normal and S2 normal. Heart sounds not distant. No murmur. No friction rub. No gallop. Pulmonary:      Effort: Pulmonary effort is normal. No tachypnea, bradypnea, accessory muscle usage, prolonged expiration, respiratory distress or retractions. Breath sounds: Normal breath sounds. Abdominal:      General: Abdomen is flat. Bowel sounds are normal. There is no distension or abdominal bruit. There are no signs of injury. Palpations: Abdomen is soft. There is no shifting dullness, fluid wave, hepatomegaly, splenomegaly, mass or pulsatile mass. Tenderness: There is no abdominal tenderness. There is no guarding or rebound. Hernia: No hernia is present. Musculoskeletal: Normal range of motion.          General: No swelling, tenderness, deformity or signs of injury. Right lower leg: No edema. Left lower leg: No edema. Lymphadenopathy:      Cervical: No cervical adenopathy. Skin:     General: Skin is warm and dry. Capillary Refill: Capillary refill takes less than 2 seconds. Neurological:      General: No focal deficit present. Mental Status: She is alert and oriented to person, place, and time. Mental status is at baseline. GCS: GCS eye subscore is 4. GCS verbal subscore is 5. GCS motor subscore is 6. Cranial Nerves: Cranial nerves are intact. Psychiatric:         Attention and Perception: Attention normal.         Mood and Affect: Mood normal.         Speech: Speech normal.         Behavior: Behavior normal. Behavior is cooperative. Thought Content: Thought content normal.         Cognition and Memory: Cognition and memory normal.         Judgment: Judgment normal.          DIFFERENTIAL DIAGNOSIS:   COVID-19 infection, viral pneumonia, viral URI, bronchitis. DIAGNOSTIC RESULTS     EKG: All EKG's are interpreted by the Emergency Department Physician who either signs or Co-signs this chart in the absence of a cardiologist.    None    RADIOLOGY: non-plainfilm images(s) such as CT, Ultrasound and MRI are read by the radiologist.    XR CHEST PORTABLE   Final Result   No acute findings. This document has been electronically signed by: Getachew Chandler MD on    03/18/2021 08:56 PM          LABS:     Labs Reviewed - No data to display    EMERGENCY DEPARTMENT COURSE:   Vitals:    Vitals:    03/18/21 2002   BP: (!) 169/89   Pulse: 91   Resp: 12   Temp: 97.9 °F (36.6 °C)   TempSrc: Oral   SpO2: 97%   Weight: 140 lb (63.5 kg)   Height: 5' 10\" (1.778 m)       8:08 PM EDT: The patient was seen and evaluated. MDM:  Patient seen evaluated today for some subjective wheezing in the presence of a positive COVID-19 test.  On my exam she is in no acute distress, she appears younger than stated age.   Chest x-ray completed showing no acute disease. She will be given albuterol inhaler, instructed to continue quarantining and to follow-up with PCP in a week for reevaluation. She had no other complaints, she was amenable this plan and departed the ED in stable condition. CRITICAL CARE:   None    CONSULTS:  None    PROCEDURES:  None    FINAL IMPRESSION      1. COVID-19 virus infection    2. Viral URI          DISPOSITION/PLAN   Discharge    PATIENT REFERRED TO:  Daniel Britt, [address] [address]  [phone]    Schedule an appointment as soon as possible for a visit in 1 week      [address] [facility] [place] [address] [address] EMERGENCY DEPT  [address]  Go to   If symptoms worsen      DISCHARGE MEDICATIONS:  Discharge Medication List as of 3/18/2021  8:55 PM          (Please note that portions of this note were completed with a voice recognition program.  Efforts were made to edit the dictations but occasionally words are mis-transcribed.)    The patient was given an opportunity to see the Emergency Attending. The patient voiced understanding that I was a Mid-LevelProvider and was in agreement with being seen independently by myself. Provider:  I personally performed the services described in the documentation, reviewed and edited the documentation which was dictated to the scribe in my presence, and it accurately records my words and actions.     AMAURY Arora CNP, 3/18/21, 5:50 PM       AMAURY Arora CNP  03/19/21 1750

## 2021-03-19 NOTE — ED TRIAGE NOTES
Pt comes to the ED via lobby. Pt States she was dx with COVID yesterday. Pt states she feels like she's been wheezing lately.

## 2021-03-19 NOTE — CARE COORDINATION
Patient contacted regarding [id number] diagnosis\". Discussed COVID-19 related testing which was not tested at ED visit. at this time. Test results were tested prior to ED visit. Patient informed of results, if available? n/a    Ambulatory Care Manager contacted the patient by telephone to perform post discharge assessment. Call within 2 business days of discharge: Yes. Verified name and  with patient as identifiers. Provided introduction to self, and explanation of the CTN/ACM role, and reason for call due to risk factors for infection and/or exposure to COVID-19. Symptoms reviewed with patient who verbalized the following symptoms: fatigue, cough, shortness of breath and no worsening symptoms. Due to no new or worsening symptoms encounter was not routed to provider for escalation. Discussed follow-up appointments. If no appointment was previously scheduled, appointment scheduling offered: [facility](s):   Future Appointments   Date Time Provider Kathy Almodovar   2021  2:00 PM Jesi Milian MD N SRPX Heart [facility]   2/3/2022 10:00 AM [email or web address] PULMONARY FUNCTION ROOM 1 STRZ PFT [facility] HOD   2/10/2022  9:00 AM AMAURY Cisneros - CNP N Pulm Med [facility] - [facility]     Non-[facility] follow up appointment(s): n/a    Non-face-to-face services provided:  Obtained and reviewed discharge summary and/or continuity of care documents     Advance Care Planning:   Does patient have an Advance Directive:  decision maker updated. Patient has following risk factors of: COPD. ACM reviewed discharge instructions, medical action plan and red flags such as increased shortness of breath, increasing fever and signs of decompensation with patient who verbalized understanding. Discussed exposure protocols and quarantine with CDC Guidelines What to do if you are sick with coronavirus disease 2019.  Patient was given an opportunity for questions and concerns.  The patient agrees to contact the [employer] exposure line [phone], local [facility] department [place] [facility] of [facility]: ([phone]) and PCP office for questions related to their healthcare. ACM provided contact information for future needs. Reviewed and educated patient on any new and changed medications related to discharge diagnosis     Was patient discharged with a pulse oximeter? No Discussed and confirmed pulse oximeter discharge instructions and when to notify provider or seek emergency care. Patient/family/caregiver given information for [facility] and agrees to enroll no  Patient's preferred e-mail: n/a   Patient's preferred phone number: n/a  Based on Loop alert triggers, patient will be contacted by nurse care manager for worsening symptoms. Plan for follow-up call in 3-5 days based on severity of symptoms and risk factors. Pt has been in contact with health dept. Actively quarantining. Denies worsening s/s. Has been in contact with PCP. Encouraged ambulation and deep breathing exercises. Was d/c with albuterol inhaler. Encouraged to use if having increased SOB or wheezing. Declines Loop.

## 2021-03-22 ENCOUNTER — OFFICE VISIT (OUTPATIENT)
Dept: INTERNAL MEDICINE CLINIC | Age: 66
End: 2021-03-22
Payer: MEDICARE

## 2021-03-22 ENCOUNTER — NURSE ONLY (OUTPATIENT)
Dept: LAB | Age: 66
End: 2021-03-22

## 2021-03-22 VITALS — TEMPERATURE: 97.9 F

## 2021-03-22 DIAGNOSIS — I71.20 THORACIC AORTIC ANEURYSM WITHOUT RUPTURE: ICD-10-CM

## 2021-03-22 DIAGNOSIS — D72.819 LEUKOPENIA, UNSPECIFIED TYPE: ICD-10-CM

## 2021-03-22 DIAGNOSIS — U07.1 COVID-19: Primary | ICD-10-CM

## 2021-03-22 LAB — WBC # BLD: 4.8 THOU/MM3 (ref 4.8–10.8)

## 2021-03-22 PROCEDURE — G8400 PT W/DXA NO RESULTS DOC: HCPCS | Performed by: INTERNAL MEDICINE

## 2021-03-22 PROCEDURE — 1036F TOBACCO NON-USER: CPT | Performed by: INTERNAL MEDICINE

## 2021-03-22 PROCEDURE — G8420 CALC BMI NORM PARAMETERS: HCPCS | Performed by: INTERNAL MEDICINE

## 2021-03-22 PROCEDURE — G8428 CUR MEDS NOT DOCUMENT: HCPCS | Performed by: INTERNAL MEDICINE

## 2021-03-22 PROCEDURE — 4040F PNEUMOC VAC/ADMIN/RCVD: CPT | Performed by: INTERNAL MEDICINE

## 2021-03-22 PROCEDURE — 1123F ACP DISCUSS/DSCN MKR DOCD: CPT | Performed by: INTERNAL MEDICINE

## 2021-03-22 PROCEDURE — G8484 FLU IMMUNIZE NO ADMIN: HCPCS | Performed by: INTERNAL MEDICINE

## 2021-03-22 PROCEDURE — 3017F COLORECTAL CA SCREEN DOC REV: CPT | Performed by: INTERNAL MEDICINE

## 2021-03-22 PROCEDURE — 1090F PRES/ABSN URINE INCON ASSESS: CPT | Performed by: INTERNAL MEDICINE

## 2021-03-22 PROCEDURE — 99213 OFFICE O/P EST LOW 20 MIN: CPT | Performed by: INTERNAL MEDICINE

## 2021-03-22 NOTE — PROGRESS NOTES
[facility] PROFESSIONAL SERVS  PHYSICIANS [place] [address] [address], [address]  Dept: [phone]  Dept Fax: [phone]      Chief Complaint   Patient presents with    Shortness of Breath     Patient presents for evaluation of COVID-19  I last saw her 3/16  Patient has not had any admissions to the hospital  since the last visit here. Pertinent past history reviewed and summarized below  She said about a week ago she developed sinus drainage and a sore throat several days ago she developed cough and congestion  She had called me and I sent her in a Z-Harpreet  She says she really feels no better she said yesterday she was sneezing constantly coughing denies any fever  I talked to her on 318 she said she thought she was short of breath wheezing  She actually went to the ER later that day  Work-up showed a clear x-ray  She is followed by cardiology for a thoracic aortic aneurysm. Recent CT of the chest showed fibro-emphysematous lungs. Cardiology set up PFTs and a pulmonary consult  She was told by pulmonary that she has COPD although she never smoked  Her PFTs were normal  Her rapid Covid test was positive  I told her to take vitamin C zinc and vitamin D  She said the health department told her her quarantine was over yesterday  Past Medical History:   Diagnosis Date    Aortic aneurysm, thoracic (HCC)     dilatation    GERD (gastroesophageal reflux disease)     Hyperlipidemia     Hypertension     Mitral valve prolapse     Pneumonia     Pulmonary emphysema ([address] 10/22/2020    SOB (shortness of breath)     Tricuspid regurgitation     mild       Current Outpatient Medications   Medication Sig Dispense Refill    metoprolol succinate (TOPROL XL) 25 MG extended release tablet take 1 tablet by mouth once daily 30 tablet 11    DM-Phenylephrine-Acetaminophen (MUCINEX SINUS-MAX MEGAN & PAIN PO) Take 2 tablets by mouth      aspirin 81 MG chewable tablet Take 81 mg by mouth daily Takes occas. No current facility-administered medications for this visit. Review of Systems still with a slight cough but feeling much better    - Temperature 97.9 °F (36.6 °C). Physical Examination: General appearance - alert, well appearing, and in no distress              Diagnosis Orders   1. COVID-19     2. Leukopenia, unspecified type     3.  Thoracic aortic aneurysm without rupture (HCC)         White count was down to 2000 and is back to 4800 today  She said she went back to work today feels much better  I recommend the vaccine in 3 months  Follow-up here 6 months

## 2021-03-23 ENCOUNTER — CARE COORDINATION (OUTPATIENT)
Dept: CARE COORDINATION | Age: 66
End: 2021-03-23

## 2021-03-23 NOTE — CARE COORDINATION
Attempted to reach [facility] today for ED f/u COVID outreach subsequent call. No answer. Unable to leave message. Pt had f/u with PCP yesterday.

## 2021-03-23 NOTE — CARE COORDINATION
Received message from Leesa Boston today returning my call. Attempted to reach her again. No answer. Message left to return call.

## 2021-05-03 ENCOUNTER — TELEPHONE (OUTPATIENT)
Dept: CARDIOLOGY CLINIC | Age: 66
End: 2021-05-03

## 2021-05-03 DIAGNOSIS — R06.02 SOB (SHORTNESS OF BREATH): ICD-10-CM

## 2021-05-03 DIAGNOSIS — R00.0 RACING HEART BEAT: ICD-10-CM

## 2021-05-03 DIAGNOSIS — I47.1 SVT (SUPRAVENTRICULAR TACHYCARDIA) (HCC): Primary | ICD-10-CM

## 2021-07-02 ENCOUNTER — NURSE ONLY (OUTPATIENT)
Dept: LAB | Age: 66
End: 2021-07-02

## 2021-07-02 DIAGNOSIS — I47.1 SVT (SUPRAVENTRICULAR TACHYCARDIA) (HCC): ICD-10-CM

## 2021-07-02 DIAGNOSIS — R00.0 RACING HEART BEAT: ICD-10-CM

## 2021-07-02 DIAGNOSIS — R06.02 SOB (SHORTNESS OF BREATH): ICD-10-CM

## 2021-07-02 LAB
ANION GAP SERPL CALCULATED.3IONS-SCNC: 9 MEQ/L (ref 8–16)
BUN BLDV-MCNC: 16 MG/DL (ref 7–22)
CALCIUM SERPL-MCNC: 10.1 MG/DL (ref 8.5–10.5)
CHLORIDE BLD-SCNC: 104 MEQ/L (ref 98–111)
CO2: 29 MEQ/L (ref 23–33)
CREAT SERPL-MCNC: 0.8 MG/DL (ref 0.4–1.2)
ERYTHROCYTE [DISTWIDTH] IN BLOOD BY AUTOMATED COUNT: 13.7 % (ref 11.5–14.5)
ERYTHROCYTE [DISTWIDTH] IN BLOOD BY AUTOMATED COUNT: 49.4 FL (ref 35–45)
GFR SERPL CREATININE-BSD FRML MDRD: 72 ML/MIN/1.73M2
GLUCOSE BLD-MCNC: 89 MG/DL (ref 70–108)
HCT VFR BLD CALC: 42.2 % (ref 37–47)
HEMOGLOBIN: 13.3 GM/DL (ref 12–16)
MCH RBC QN AUTO: 30.7 PG (ref 26–33)
MCHC RBC AUTO-ENTMCNC: 31.5 GM/DL (ref 32.2–35.5)
MCV RBC AUTO: 97.5 FL (ref 81–99)
PLATELET # BLD: 221 THOU/MM3 (ref 130–400)
PMV BLD AUTO: 10.6 FL (ref 9.4–12.4)
POTASSIUM SERPL-SCNC: 4.6 MEQ/L (ref 3.5–5.2)
RBC # BLD: 4.33 MILL/MM3 (ref 4.2–5.4)
SODIUM BLD-SCNC: 142 MEQ/L (ref 135–145)
TSH SERPL DL<=0.05 MIU/L-ACNC: 1.73 UIU/ML (ref 0.4–4.2)
WBC # BLD: 4.9 THOU/MM3 (ref 4.8–10.8)

## 2021-07-02 RX ORDER — METOPROLOL SUCCINATE 25 MG/1
TABLET, EXTENDED RELEASE ORAL
Qty: 90 TABLET | Refills: 0 | Status: SHIPPED | OUTPATIENT
Start: 2021-07-02 | End: 2021-07-28 | Stop reason: SDUPTHER

## 2021-07-02 NOTE — TELEPHONE ENCOUNTER
[facility] [place] called requesting a refill on the following medications:  Requested Prescriptions     Pending Prescriptions Disp Refills    metoprolol succinate (TOPROL XL) 25 MG extended release tablet 30 tablet 11     Sig: take 1 tablet by mouth once daily     [facility]   . pv      Date of last visit:   Date of next visit (if applicable): 2/90/0056

## 2021-07-06 ENCOUNTER — HOSPITAL ENCOUNTER (OUTPATIENT)
Dept: NON INVASIVE DIAGNOSTICS | Age: 66
Discharge: HOME | End: 2021-07-06
Payer: MEDICARE

## 2021-07-06 DIAGNOSIS — R00.0 RACING HEART BEAT: ICD-10-CM

## 2021-07-06 DIAGNOSIS — I47.1 SVT (SUPRAVENTRICULAR TACHYCARDIA) (HCC): ICD-10-CM

## 2021-07-06 DIAGNOSIS — R06.02 SOB (SHORTNESS OF BREATH): ICD-10-CM

## 2021-07-06 PROCEDURE — 93270 REMOTE 30 DAY ECG REV/REPORT: CPT

## 2021-07-06 NOTE — PROCEDURES
The skin was prepped and a 14 day cardiac event monitor was applied. The patient was instructed on the documentation of symptoms and the purpose of the monitor as well as the things to avoid while wearing the monitor. The patient was instructed to remove and return the monitor on 07/20/2021.   The serial number of the monitor that was applied is [id number]

## 2021-07-20 ENCOUNTER — OFFICE VISIT (OUTPATIENT)
Dept: CARDIOLOGY CLINIC | Age: 66
End: 2021-07-20
Payer: MEDICARE

## 2021-07-20 VITALS
WEIGHT: 138.6 LBS | HEIGHT: 70 IN | HEART RATE: 61 BPM | SYSTOLIC BLOOD PRESSURE: 121 MMHG | DIASTOLIC BLOOD PRESSURE: 76 MMHG | BODY MASS INDEX: 19.84 KG/M2

## 2021-07-20 DIAGNOSIS — I10 ESSENTIAL HYPERTENSION: ICD-10-CM

## 2021-07-20 DIAGNOSIS — I71.21 ASCENDING AORTIC ANEURYSM: ICD-10-CM

## 2021-07-20 DIAGNOSIS — R06.02 SOB (SHORTNESS OF BREATH): ICD-10-CM

## 2021-07-20 DIAGNOSIS — R00.0 RACING HEART BEAT: Primary | ICD-10-CM

## 2021-07-20 PROCEDURE — 93000 ELECTROCARDIOGRAM COMPLETE: CPT | Performed by: INTERNAL MEDICINE

## 2021-07-20 PROCEDURE — 1123F ACP DISCUSS/DSCN MKR DOCD: CPT | Performed by: INTERNAL MEDICINE

## 2021-07-20 PROCEDURE — 1036F TOBACCO NON-USER: CPT | Performed by: INTERNAL MEDICINE

## 2021-07-20 PROCEDURE — 1090F PRES/ABSN URINE INCON ASSESS: CPT | Performed by: INTERNAL MEDICINE

## 2021-07-20 PROCEDURE — G8427 DOCREV CUR MEDS BY ELIG CLIN: HCPCS | Performed by: INTERNAL MEDICINE

## 2021-07-20 PROCEDURE — 4040F PNEUMOC VAC/ADMIN/RCVD: CPT | Performed by: INTERNAL MEDICINE

## 2021-07-20 PROCEDURE — 3017F COLORECTAL CA SCREEN DOC REV: CPT | Performed by: INTERNAL MEDICINE

## 2021-07-20 PROCEDURE — 99213 OFFICE O/P EST LOW 20 MIN: CPT | Performed by: INTERNAL MEDICINE

## 2021-07-20 PROCEDURE — G8400 PT W/DXA NO RESULTS DOC: HCPCS | Performed by: INTERNAL MEDICINE

## 2021-07-20 PROCEDURE — G8420 CALC BMI NORM PARAMETERS: HCPCS | Performed by: INTERNAL MEDICINE

## 2021-07-20 NOTE — PROGRESS NOTES
[address] [facility] [address]  [facility] [address]  Dept: [phone]  Dept Fax: [phone]  Loc: [phone]    Visit Date: 2021    Ms. Whit Sanabria is a 72 y.o. female  who presented for:  Chief Complaint   Patient presents with    1 Year Follow Up       HPI:   HPI   73 yo F who presents for HTN, TAA 4.2 cm, who presents for follow-up. Stable on CT. Patient had COVID in late March, then had racing HR, awaiting monitor. She is going to return it now. She is on BB. She is on ASA as well. Patient did not want to take any more meds. Current Outpatient Medications:     metoprolol succinate (TOPROL XL) 25 MG extended release tablet, take 1 tablet by mouth once daily, Disp: 90 tablet, Rfl: 0    DM-Phenylephrine-Acetaminophen (MUCINEX SINUS-MAX MEGAN & PAIN PO), Take 2 tablets by mouth, Disp: , Rfl:     aspirin 81 MG chewable tablet, Take 81 mg by mouth daily Takes occas., Disp: , Rfl:     Past Medical History  Roxy Way  has a past medical history of Aortic aneurysm, thoracic ([address] GERD (gastroesophageal reflux disease), Hyperlipidemia, Hypertension, Mitral valve prolapse, Pneumonia, Pulmonary emphysema (HCC), SOB (shortness of breath), and Tricuspid regurgitation. Social History  Roxy Way  reports that she has never smoked. She has never used smokeless tobacco. She reports current alcohol use. She reports that she does not use drugs. Family History  Roxy Way family history includes Cancer in her mother; Dementia in her mother; Heart Disease in her paternal grandfather; Heart Disease (age of onset: 80) in her father; High Blood Pressure in her mother. There is no family history of bicuspid aortic valve, aneurysms, heart transplant, pacemakers, defibrillators, or sudden cardiac death.       Past Surgical History   Past Surgical History:   Procedure Laterality Date    CARDIAC CATHETERIZATION  3/4/2010     Psychiatric: Normal mood and affect.        No results found for: CKTOTAL, CKMB, CKMBINDEX    Lab Results   Component Value Date    WBC 4.9 07/02/2021    RBC 4.33 07/02/2021    HGB 13.3 07/02/2021    HCT 42.2 07/02/2021    MCV 97.5 07/02/2021    MCH 30.7 07/02/2021    MCHC 31.5 07/02/2021    RDW 13.6 08/08/2017     07/02/2021    MPV 10.6 07/02/2021       Lab Results   Component Value Date     07/02/2021    K 4.6 07/02/2021     07/02/2021    CO2 29 07/02/2021    BUN 16 07/02/2021    LABALBU 4.6 03/16/2021    CREATININE 0.8 07/02/2021    CALCIUM 10.1 07/02/2021    LABGLOM 72 07/02/2021    GLUCOSE 89 07/02/2021       Lab Results   Component Value Date    ALKPHOS 44 03/16/2021    ALT 24 03/16/2021    AST 28 03/16/2021    PROT 7.4 03/16/2021    BILITOT 0.4 03/16/2021    LABALBU 4.6 03/16/2021       No results found for: MG    No results found for: INR, PROTIME      No results found for: LABA1C    Lab Results   Component Value Date    TRIG 62 02/04/2013    HDL 84 02/04/2013    LDLCALC 113 02/04/2013       Lab Results   Component Value Date    TSH 1.730 07/02/2021         Testing Reviewed:      I have individually reviewed the cardiac test below:    ECHO: Results for orders placed during the hospital encounter of 04/26/19    Echo 2D w doppler w color complete    Narrative  Transthoracic Echocardiography Report (TTE)    Demographics    Patient Name   [facility]           Gender              Female  Semaj Munoz    MR #           [id number]          Race                    Ethnicity    Account #      [de-identified]          Room Number    Accession      [id number]          Date of Study       04/26/2019  Number    Date of Birth  1955         Referring Physician Tarri Barker, MD Marylen Braun MD    Age            61 year(s)         Eleni Cerda MD  Physician           Leigh Hopkins MD    Procedure    Type of Study    TTE procedure:ECHOCARDIOGRAM COMPLETE 2D W DOPPLER W COLOR. Procedure Date  Date: 04/26/2019 Start: 08:46 AM    Study Location: Echo Lab  Technical Quality: Adequate visualization    Indications:Orthopnea. Additional Medical History:Shortness of breath, Mitral valve prolapse,  Hypertension, Hyperlipidemia, GERD, Thoracic aortic aneurysm, SVT    Patient Status: Routine    Height: 68.9 inches Weight: 138 pounds BSA: 1.76 m^2 BMI: 20.44 kg/m^2    BP: 110/64 mmHg    Conclusions    Summary  Ejection fraction is visually estimated at 60%. Overall left ventricular function is normal.    Signature    ----------------------------------------------------------------  Electronically signed by Jayesh Dean MD (Interpreting  physician) on 04/26/2019 at 06:29 PM  ----------------------------------------------------------------    Findings    Mitral Valve  Trace mitral regurgitation is present. Aortic Valve  The aortic valve was trileaflet with normal thickness and cuspal  separation. DOPPLER: Transaortic velocity was within the normal range with  no evidence of aortic stenosis. There was no evidence of aortic  regurgitation. Tricuspid Valve  The tricuspid valve structure was normal with normal leaflet separation. DOPPLER: There was no evidence of tricuspid stenosis. There was no  evidence of tricuspid regurgitation. Pulmonic Valve  The pulmonic valve was not well visualized . Trivial pulmonic regurgitation visualized. Left Atrium  Left atrial size was normal.    Left Ventricle  Ejection fraction is visually estimated at 60%. Overall left ventricular function is normal.    Right Atrium  Right atrial size was normal.    Right Ventricle  The right ventricular size was normal with normal systolic function and  wall thickness. Pericardial Effusion  The pericardium was normal in appearance with no evidence of a pericardial  effusion. Pleural Effusion  No evidence of pleural effusion.     Aorta / Ronaldo Boys Vessels  -Aortic root dimension within normal limits.  -The Pulmonary artery is within normal limits. -IVC size is within normal limits with normal respiratory phasic changes.     M-Mode/2D Measurements & Calculations    LV Diastolic    LV Systolic Dimension: 3  AV Cusp Separation: 2.1 cmLA  Dimension: 4.9  cm                        Dimension: 2.7 cmAO Root  cm              LV Volume Diastolic: 662  Dimension: 2.9 cmLA Area: 24.8  LV FS:38.8 %    ml                        cm^2  LV PW           LV Volume Systolic: 35 ml  Diastolic: 0.8  LV EDV/LV EDV Index: 113  cm              ml/64 m^2LV ESV/LV ESV  Septum          Index: 35 ml/20 m^2       RV Diastolic Dimension: 3 cm  Diastolic: 0.7  EF Calculated: 69 %  cm                                        LA/Aorta: 0.93    LA volume/Index: 74.5 ml /42m^2    Doppler Measurements & Calculations    MV Peak E-Wave: 62.9 cm/s  AV Peak Velocity: 119  LVOT Peak Velocity: 104  MV Peak A-Wave: 79.8 cm/s  cm/s                   cm/s  MV E/A Ratio: 0.79         AV Peak Gradient: 5.66 LVOT Peak Gradient: 4  MV Peak Gradient: 1.58     mmHg                   mmHg  mmHg  TV Peak E-Wave: 63.6  MV Deceleration Time: 327                         cm/s  msec                                              TV Peak A-Wave: 50.7  IVRT: 85 msec          cm/s    MV E' Septal Velocity: 6                          TV Peak Gradient: 1.62  cm/s                       AV DVI (Vmax):0.87     mmHg  MV A' Septal Velocity: 8.8                        TR Velocity:251 cm/s  cm/s                                              TR Gradient:25.2 mmHg  MV E' Lateral Velocity:                           PV Peak Velocity: 64.3  8.2 cm/s                                          cm/s  MV A' Lateral Velocity:                           PV Peak Gradient: 1.65  9.4 cm/s                                          mmHg  E/E' septal: 10.48  E/E' lateral: 7.67  MR Velocity: 345 cm/s                             [place] ED Velocity: 128 cm/s    [email or web address]       Assessment/Plan   TAA 4.2 cm, 2/2021  Recent palpitations s/p COVID  Doing well, no issues. She is on BB. Await monitor, not life limiting. She otherwise has no issues. Continue surveillance imaging of the TAA. She wants to avoid repetitive radiation, will discussed next year regarding MRA vs TTE. Discussed diet/exercise/BP/weight loss/health lifestyle choices/lipids; the patient understands the goals and will try to comply.     Disposition:  1 year         Electronically signed by Oc Castillo MD   7/20/2021 at 10:28 AM EDT

## 2021-07-26 NOTE — PROCEDURES
[address] [place] [address]                                 EVENT MONITOR    PATIENT NAME: Franky Solano              :        1955  MED REC NO:   [id number]                           ROOM:  ACCOUNT NO:   [de-identified]                           ADMIT DATE: 2021  PROVIDER:     Andrea Negron MD    TEST TYPE:  Two-week event monitor. MONITORING PERIOD:  2021 to 2021. INDICATIONS:  Palpitation, shortness of breath. FINDINGS:  Baseline rhythm is sinus rhythm with PACs. There were  episodes of PACs and PVCs that were associated with flutter and a  skipped heartbeat type sensation. These were relatively consistent. No  signs of SVT or VT noted. No findings of atrial fibrillation or flutter  noted. There were no findings of high-grade AV block. SUMMARY:  Symptomatic PACs/PVCs.         Jessica Diaz MD    D: 2021 6:42:36       T: 2021 9:59:39     [email or web address]  Job#: [id number]     Doc#: [id number]    CC:

## 2021-07-27 NOTE — TELEPHONE ENCOUNTER
Event Monitor results    ----- Message from Dez Edouard MD sent at 7/27/2021  4:50 PM EDT -----  Can we add or increase bb

## 2021-07-28 RX ORDER — METOPROLOL SUCCINATE 50 MG/1
50 TABLET, EXTENDED RELEASE ORAL DAILY
Qty: 90 TABLET | Refills: 3 | Status: SHIPPED | OUTPATIENT
Start: 2021-07-28 | End: 2022-09-12 | Stop reason: SDUPTHER

## 2021-08-04 NOTE — TELEPHONE ENCOUNTER
Talked to patient and she is aware to report to the office any dizziness, lightheadedness or fatigue. Advised patient to get a BP cuff at her local pharmacy to monitor HR and BP and to report any abnormal readings.

## 2021-08-10 ENCOUNTER — NURSE ONLY (OUTPATIENT)
Dept: LAB | Age: 66
End: 2021-08-10

## 2021-08-10 DIAGNOSIS — Z11.59 SCREENING FOR VIRAL DISEASE: ICD-10-CM

## 2021-08-10 DIAGNOSIS — Z11.59 SCREENING FOR VIRAL DISEASE: Primary | ICD-10-CM

## 2021-08-11 LAB — SARS-COV-2 ANTIBODY, TOTAL: POSITIVE

## 2021-10-10 ENCOUNTER — APPOINTMENT (OUTPATIENT)
Dept: GENERAL RADIOLOGY | Age: 66
End: 2021-10-10
Payer: MEDICARE

## 2021-10-10 ENCOUNTER — HOSPITAL ENCOUNTER (EMERGENCY)
Age: 66
Discharge: HOME | End: 2021-10-11
Payer: MEDICARE

## 2021-10-10 VITALS
OXYGEN SATURATION: 99 % | HEART RATE: 73 BPM | TEMPERATURE: 98.2 F | SYSTOLIC BLOOD PRESSURE: 163 MMHG | DIASTOLIC BLOOD PRESSURE: 82 MMHG | RESPIRATION RATE: 16 BRPM

## 2021-10-10 DIAGNOSIS — R07.9 CHEST PAIN, UNSPECIFIED TYPE: ICD-10-CM

## 2021-10-10 DIAGNOSIS — K21.9 GASTROESOPHAGEAL REFLUX DISEASE, UNSPECIFIED WHETHER ESOPHAGITIS PRESENT: Primary | ICD-10-CM

## 2021-10-10 LAB
ALBUMIN SERPL-MCNC: 5 G/DL (ref 3.5–5.1)
ALP BLD-CCNC: 53 U/L (ref 38–126)
ALT SERPL-CCNC: 18 U/L (ref 11–66)
ANION GAP SERPL CALCULATED.3IONS-SCNC: 13 MEQ/L (ref 8–16)
AST SERPL-CCNC: 32 U/L (ref 5–40)
BASOPHILS # BLD: 0.5 %
BASOPHILS ABSOLUTE: 0 THOU/MM3 (ref 0–0.1)
BILIRUB SERPL-MCNC: 0.5 MG/DL (ref 0.3–1.2)
BUN BLDV-MCNC: 11 MG/DL (ref 7–22)
CALCIUM SERPL-MCNC: 9.7 MG/DL (ref 8.5–10.5)
CHLORIDE BLD-SCNC: 100 MEQ/L (ref 98–111)
CO2: 26 MEQ/L (ref 23–33)
CREAT SERPL-MCNC: 0.8 MG/DL (ref 0.4–1.2)
EOSINOPHIL # BLD: 0.8 %
EOSINOPHILS ABSOLUTE: 0 THOU/MM3 (ref 0–0.4)
ERYTHROCYTE [DISTWIDTH] IN BLOOD BY AUTOMATED COUNT: 12.8 % (ref 11.5–14.5)
ERYTHROCYTE [DISTWIDTH] IN BLOOD BY AUTOMATED COUNT: 45.8 FL (ref 35–45)
GFR SERPL CREATININE-BSD FRML MDRD: 72 ML/MIN/1.73M2
GLUCOSE BLD-MCNC: 115 MG/DL (ref 70–108)
HCT VFR BLD CALC: 44 % (ref 37–47)
HEMOGLOBIN: 14 GM/DL (ref 12–16)
IMMATURE GRANS (ABS): 0.01 THOU/MM3 (ref 0–0.07)
IMMATURE GRANULOCYTES: 0.2 %
LIPASE: 40.2 U/L (ref 5.6–51.3)
LYMPHOCYTES # BLD: 19.1 %
LYMPHOCYTES ABSOLUTE: 1.1 THOU/MM3 (ref 1–4.8)
MCH RBC QN AUTO: 31 PG (ref 26–33)
MCHC RBC AUTO-ENTMCNC: 31.8 GM/DL (ref 32.2–35.5)
MCV RBC AUTO: 97.3 FL (ref 81–99)
MONOCYTES # BLD: 8 %
MONOCYTES ABSOLUTE: 0.5 THOU/MM3 (ref 0.4–1.3)
NUCLEATED RED BLOOD CELLS: 0 /100 WBC
OSMOLALITY CALCULATION: 277.9 MOSMOL/KG (ref 275–300)
PLATELET # BLD: 223 THOU/MM3 (ref 130–400)
PMV BLD AUTO: 10.9 FL (ref 9.4–12.4)
POTASSIUM SERPL-SCNC: 3.8 MEQ/L (ref 3.5–5.2)
RBC # BLD: 4.52 MILL/MM3 (ref 4.2–5.4)
SEG NEUTROPHILS: 71.4 %
SEGMENTED NEUTROPHILS ABSOLUTE COUNT: 4.3 THOU/MM3 (ref 1.8–7.7)
SODIUM BLD-SCNC: 139 MEQ/L (ref 135–145)
TOTAL PROTEIN: 7.8 G/DL (ref 6.1–8)
TROPONIN T: < 0.01 NG/ML
WBC # BLD: 6 THOU/MM3 (ref 4.8–10.8)

## 2021-10-10 PROCEDURE — 99282 EMERGENCY DEPT VISIT SF MDM: CPT

## 2021-10-10 PROCEDURE — 36415 COLL VENOUS BLD VENIPUNCTURE: CPT

## 2021-10-10 PROCEDURE — 71046 X-RAY EXAM CHEST 2 VIEWS: CPT

## 2021-10-10 PROCEDURE — 85025 COMPLETE CBC W/AUTO DIFF WBC: CPT

## 2021-10-10 PROCEDURE — 6370000000 HC RX 637 (ALT 250 FOR IP): Performed by: PHYSICIAN ASSISTANT

## 2021-10-10 PROCEDURE — 83690 ASSAY OF LIPASE: CPT

## 2021-10-10 PROCEDURE — 70360 X-RAY EXAM OF NECK: CPT

## 2021-10-10 PROCEDURE — 84484 ASSAY OF TROPONIN QUANT: CPT

## 2021-10-10 PROCEDURE — 93005 ELECTROCARDIOGRAM TRACING: CPT | Performed by: PHYSICIAN ASSISTANT

## 2021-10-10 PROCEDURE — 80053 COMPREHEN METABOLIC PANEL: CPT

## 2021-10-10 RX ORDER — METOCLOPRAMIDE 10 MG/1
10 TABLET ORAL ONCE
Status: COMPLETED | OUTPATIENT
Start: 2021-10-10 | End: 2021-10-10

## 2021-10-10 RX ORDER — METOCLOPRAMIDE 10 MG/1
10 TABLET ORAL
Qty: 20 TABLET | Refills: 0 | Status: SHIPPED | OUTPATIENT
Start: 2021-10-10 | End: 2022-01-04 | Stop reason: ALTCHOICE

## 2021-10-10 RX ADMIN — LIDOCAINE HYDROCHLORIDE: 20 SOLUTION ORAL; TOPICAL at 22:30

## 2021-10-10 RX ADMIN — METOCLOPRAMIDE 10 MG: 10 TABLET ORAL at 22:30

## 2021-10-11 LAB
EKG ATRIAL RATE: 60 BPM
EKG P AXIS: 67 DEGREES
EKG P-R INTERVAL: 132 MS
EKG Q-T INTERVAL: 426 MS
EKG QRS DURATION: 100 MS
EKG QTC CALCULATION (BAZETT): 426 MS
EKG R AXIS: 60 DEGREES
EKG T AXIS: 50 DEGREES
EKG VENTRICULAR RATE: 60 BPM

## 2021-10-11 NOTE — ED PROVIDER NOTES
breath), and Tricuspid regurgitation. SURGICAL HISTORY      has a past surgical history that includes Cardiac catheterization (3/4/2010); knee surgery; Tonsillectomy; and  section. CURRENT MEDICATIONS       Discharge Medication List as of 10/10/2021 11:48 PM      CONTINUE these medications which have NOT CHANGED    Details   metoprolol succinate (TOPROL XL) 50 MG extended release tablet Take 1 tablet by mouth daily take 1 tablet by mouth once daily, Disp-90 tablet, R-3Normal      DM-Phenylephrine-Acetaminophen (MUCINEX SINUS-MAX MEGAN & PAIN PO) Take 2 tablets by mouthHistorical Med      aspirin 81 MG chewable tablet Take 81 mg by mouth daily Takes occas. ALLERGIES     is allergic to ciprofloxacin. FAMILY HISTORY     She indicated that her mother is alive. She indicated that her father is . She indicated that the status of her paternal grandfather is unknown.   family history includes Cancer in her mother; Dementia in her mother; Heart Disease in her paternal grandfather; Heart Disease (age of onset: 80) in her father; High Blood Pressure in her mother. SOCIAL HISTORY    reports that she has never smoked. She has never used smokeless tobacco. She reports current alcohol use. She reports that she does not use drugs. PHYSICAL EXAM     INITIAL VITALS:  oral temperature is 98.2 °F (36.8 °C). Her blood pressure is 163/82 (abnormal) and her pulse is 73. Her respiration is 16 and oxygen saturation is 99%. Physical Exam  Constitutional:       Appearance: Normal appearance. She is well-developed. She is not ill-appearing or diaphoretic. HENT:      Head: Normocephalic and atraumatic. Right Ear: Hearing normal.      Left Ear: Hearing normal.      Nose: Nose normal. No rhinorrhea. Mouth/Throat:      Lips: Pink. Mouth: Mucous membranes are moist.      Pharynx: Oropharynx is clear. Eyes:      General: Lids are normal. No scleral icterus.      Extraocular Movements: Extraocular movements intact. Conjunctiva/sclera: Conjunctivae normal.      Pupils: Pupils are equal, round, and reactive to light. Neck:      Trachea: Trachea normal.   Cardiovascular:      Rate and Rhythm: Normal rate and regular rhythm. Heart sounds: Normal heart sounds. No murmur heard. Pulmonary:      Effort: Pulmonary effort is normal.      Breath sounds: Normal breath sounds and air entry. No decreased breath sounds, wheezing or rhonchi. Abdominal:      General: There is no distension. Palpations: Abdomen is soft. Tenderness: There is no abdominal tenderness. Musculoskeletal:      Cervical back: Normal range of motion and neck supple. No rigidity. Comments: Well perfused; movement normal as observed; no signs of DVT   Lymphadenopathy:      Cervical: No cervical adenopathy. Skin:     General: Skin is warm and dry. Findings: No rash. Neurological:      General: No focal deficit present. Mental Status: She is alert. Sensory: Sensation is intact. Motor: Motor function is intact. Gait: Gait is intact. Psychiatric:         Mood and Affect: Mood normal.         Speech: Speech normal.         Behavior: Behavior is cooperative.          DIFFERENTIAL DIAGNOSIS:   Including but not limited to: Gastritis, GERD, gastroparesis, ACS, globus hystericus, anxiety    DIAGNOSTIC RESULTS     EKG: All EKG's are interpreted by [facility] Physician who either signs or Co-signs this chart in the absence of a cardiologist.  Ventricular Rate BPM 60    Atrial Rate BPM 60    P-R Interval ms 132    QRS Duration ms 100    Q-T Interval ms 426    QTc Calculation (Bazett) ms 426    P Axis degrees 67    R Axis degrees 60    T Axis degrees 50    Narrative & Impression    Sinus rhythm with marked sinus arrhythmia  Possible Left atrial enlargement  Incomplete right bundle branch block  Borderline ECG         RADIOLOGY: non-plain film images(s) such as CT,Ultrasound and MRI are read by the radiologist.  Plain radiographic images are visualized and preliminarily interpreted by the emergency physician unless otherwise stated below. XR CHEST (2 VW)   Final Result   There is no acute intrathoracic process. **This report has been created using voice recognition software. It may contain minor errors which are inherent in voice recognition technology. **      Final report electronically signed by Dr Donna Juarez on 10/10/2021 10:51 PM      XR NECK SOFT TISSUE   Final Result   No radiodense foreign bodies are visualized. **This report has been created using voice recognition software. It may contain minor errors which are inherent in voice recognition technology. **      Final report electronically signed by Dr Donna Juarez on 10/10/2021 9:14 PM          LABS:   Labs Reviewed   CBC WITH AUTO DIFFERENTIAL - Abnormal; Notable for the following components:       Result Value    MCHC 31.8 (*)     RDW-SD 45.8 (*)     All other components within normal limits   COMPREHENSIVE METABOLIC PANEL - Abnormal; Notable for the following components:    Glucose 115 (*)     All other components within normal limits   GLOMERULAR FILTRATION RATE, ESTIMATED - Abnormal; Notable for the following components:    Est, Glom Filt Rate 72 (*)     All other components within normal limits   TROPONIN   LIPASE   ANION GAP   OSMOLALITY       EMERGENCY DEPARTMENT COURSE:   Vitals:    Vitals:    10/10/21 2038   BP: (!) 163/82   Pulse: 73   Resp: 16   Temp: 98.2 °F (36.8 °C)   TempSrc: Oral   SpO2: 99%       Patient was seen in the emergency department during the global pandemic, when there was surge capacity and regional healthcare crisis. MDM:  The patient was seen and evaluated within the ED today for chest pressure, uneasy feeling in her stomach, excessive belching, and difficulty swallowing. On exam, I appreciated no acute findings. Patient was managing his own secretions.   Old records were reviewed. Within the department, I observed the patient's vital signs to be within acceptable range. Radiological studies within the department revealed no acute intra cardiopulmonary process. Laboratory work was reassuring. Within the department the patient was treated with Reglan and GI cocktail. I observed the patient's condition to modestly improve during the duration of their stay. On re-exam patient's symptoms were improved. Vital signs remained stable. The patient remained non-toxic appearing with no distress evident in the ER. The patient was comfortable with the plan of discharge home and to follow up with gastroenterology and her cardiologist. Anticipatory guidance was given. The patient was instructed to return to the emergency department for any worsening of their symptoms. Patient was discharged from the emergency department in good condition with all questions answered. See disposition below. I have given the patient strict written and verbal instructions about care at home, follow-up, and signs and symptoms of worsening of condition and they did verbalize understanding. I estimate there is LOW risk for PULMONARY EMBOLISM, ACUTE CORONARY SYNDROME, OR THORACIC AORTIC DISSECTION, thus I consider the discharge disposition reasonable. The patient and/or family and I have discussed the diagnosis and risks, and we agree with discharging home to follow-up with their primary doctor. We also discussed returning to the Emergency Department immediately if new or worsening symptoms occur. We have discussed the symptoms which are most concerning (e.g., bloody sputum, fever, worsening pain or shortness of breath, vomiting) that necessitate immediate return. CRITICAL CARE:   None    CONSULTS:  None    PROCEDURES:  None    FINAL IMPRESSION      1. Gastroesophageal reflux disease, unspecified whether esophagitis present    2. Chest pain, unspecified type          DISPOSITION/PLAN     1.  Gastroesophageal reflux disease, unspecified whether esophagitis present    2.  Chest pain, unspecified type        PATIENT REFERRED TO:  René Mejia MD  [facility] [address] [address] [phone]    Schedule an appointment as soon as possible for a visit       Damian Man MD  [address]    Schedule an appointment as soon as possible for a visit         DISCHARGE MEDICATIONS:  Discharge Medication List as of 10/10/2021 11:48 PM      START taking these medications    Details   metoclopramide (REGLAN) 10 MG tablet Take 1 tablet by mouth 4 times daily (before meals and nightly) When necessary for nausea or vomiting, Disp-20 tablet, R-0Print             (Please note that portions of this note were completed with a voice recognition program.  Efforts were made to edit the dictations but occasionally words are mis-transcribed.)    Hodan Grimaldo PA-C 10/11/21 5:42 AM    ALLIE Cook PA-C  10/12/21 1922

## 2021-10-11 NOTE — ED TRIAGE NOTES
Patient presents to ED with c/o indigestion and heartburn x1 week. States that now it feels like she has something stuck in her throat. Denies any chest pain or shortness of breath.

## 2021-10-12 ASSESSMENT — ENCOUNTER SYMPTOMS
TROUBLE SWALLOWING: 1
DIARRHEA: 0
SHORTNESS OF BREATH: 0
PHOTOPHOBIA: 0
NAUSEA: 1
ABDOMINAL PAIN: 0
RHINORRHEA: 0
SORE THROAT: 0
VOMITING: 0
COUGH: 0

## 2021-12-16 ENCOUNTER — HOSPITAL ENCOUNTER (EMERGENCY)
Age: 66
Discharge: HOME | End: 2021-12-16
Payer: MEDICARE

## 2021-12-16 VITALS
WEIGHT: 138 LBS | DIASTOLIC BLOOD PRESSURE: 68 MMHG | TEMPERATURE: 98.3 F | OXYGEN SATURATION: 100 % | BODY MASS INDEX: 19.8 KG/M2 | HEART RATE: 66 BPM | RESPIRATION RATE: 16 BRPM | SYSTOLIC BLOOD PRESSURE: 140 MMHG

## 2021-12-16 DIAGNOSIS — H61.23 BILATERAL IMPACTED CERUMEN: Primary | ICD-10-CM

## 2021-12-16 PROCEDURE — 99213 OFFICE O/P EST LOW 20 MIN: CPT | Performed by: NURSE PRACTITIONER

## 2021-12-16 PROCEDURE — 69209 REMOVE IMPACTED EAR WAX UNI: CPT

## 2021-12-16 PROCEDURE — 99213 OFFICE O/P EST LOW 20 MIN: CPT

## 2021-12-16 ASSESSMENT — ENCOUNTER SYMPTOMS
VOMITING: 0
SHORTNESS OF BREATH: 0
RHINORRHEA: 0
DIARRHEA: 0
SORE THROAT: 0
NAUSEA: 0
COUGH: 0
CHEST TIGHTNESS: 0

## 2021-12-16 NOTE — ED TRIAGE NOTES
Patient to room with c/o bilateral ear fullness beginning six weeks ago. C/o intermittent aching to right ear beginning six weeks ago.

## 2021-12-16 NOTE — ED PROVIDER NOTES
medications which have NOT CHANGED    Details   polyethylene glycol (GLYCOLAX) 17 GM/SCOOP powder Colonoscopy Prep Dispense 238 Gram Bottle. Use as Directed, Disp-238 g, R-0Normal      omeprazole (PRILOSEC) 20 MG delayed release capsule Take 1 capsule by mouth every morning (before breakfast), Disp-30 capsule, R-3Normal      metoclopramide (REGLAN) 10 MG tablet Take 1 tablet by mouth 4 times daily (before meals and nightly) When necessary for nausea or vomiting, Disp-20 tablet, R-0Print      metoprolol succinate (TOPROL XL) 50 MG extended release tablet Take 1 tablet by mouth daily take 1 tablet by mouth once daily, Disp-90 tablet, R-3Normal      DM-Phenylephrine-Acetaminophen (MUCINEX SINUS-MAX MEGAN & PAIN PO) Take 2 tablets by mouthHistorical Med      aspirin 81 MG chewable tablet Take 81 mg by mouth daily Takes occas. ALLERGIES     Patient is is allergic to ciprofloxacin. Patients   Immunization History   Administered Date(s) Administered    Influenza Whole 01/10/2013    Tdap (Boostrix, Adacel) 09/11/2019       FAMILY HISTORY     Patient's family history includes Cancer in her mother; Dementia in her mother; Heart Disease in her paternal grandfather; Heart Disease (age of onset: 80) in her father; High Blood Pressure in her mother. SOCIAL HISTORY     Patient  reports that she has never smoked. She has never used smokeless tobacco. She reports current alcohol use of about 7.0 standard drinks of alcohol per week. She reports that she does not use drugs. PHYSICAL EXAM     ED TRIAGE VITALS  BP: (!) 140/68, Temp: 98.3 °F (36.8 °C), Pulse: 66, Resp: 16, SpO2: 100 %,Estimated body mass index is 19.8 kg/m² as calculated from the following:    Height as of 10/20/21: 5' 10\" (1.778 m). Weight as of this encounter: 138 lb (62.6 kg). ,No LMP recorded. Patient is postmenopausal.    Physical Exam  Vitals and nursing note reviewed. Constitutional:       General: She is not in acute distress. Appearance: Normal appearance. She is not ill-appearing, toxic-appearing or diaphoretic. HENT:      Head: Normocephalic. Right Ear: Ear canal and external ear normal. There is impacted cerumen. Left Ear: Ear canal and external ear normal. There is impacted cerumen. Nose: Nose normal. No congestion or rhinorrhea. Mouth/Throat:      Mouth: Mucous membranes are moist.      Pharynx: Oropharynx is clear. No oropharyngeal exudate or posterior oropharyngeal erythema. Cardiovascular:      Rate and Rhythm: Normal rate. Pulses: Normal pulses. Pulmonary:      Effort: Pulmonary effort is normal. No respiratory distress. Breath sounds: No stridor. No wheezing or rhonchi. Abdominal:      General: Abdomen is flat. Bowel sounds are normal.      Palpations: Abdomen is soft. Musculoskeletal:         General: No swelling or tenderness. Normal range of motion. Cervical back: Normal range of motion. Neurological:      General: No focal deficit present. Mental Status: She is alert and oriented to person, place, and time. Psychiatric:         Mood and Affect: Mood normal.         Behavior: Behavior normal.         DIAGNOSTIC RESULTS     Labs:No results found for this visit on 12/16/21. IMAGING:    No orders to display         EKG: None      URGENT CARE COURSE:     Vitals:    12/16/21 1047 12/16/21 1049   BP:  (!) 140/68   Pulse: 66    Resp: 16    Temp: 98.3 °F (36.8 °C)    TempSrc: Temporal    SpO2: 100%    Weight: 138 lb (62.6 kg)        Medications - No data to display         PROCEDURES:  None    FINAL IMPRESSION      1. Bilateral impacted cerumen          DISPOSITION/ PLAN     Patient seen and evaluated for bilateral ear pain. Assessment consistent with bilateral impacted cerumen. Bilateral ears were flushed. We did clear the right ear canal, but the left did not completely clear. She did become slightly dizzy so the bilateral ear flushing was ceased.  She is instructed to try Debrox over-the-counter. She is instructed use over-the-counter Tylenol and Motrin for pain or fever. Instructed to follow-up with her PCP in 3 to 5 days or worsening symptoms. She is agreeable to plan and denies questions or concerns this time.       PATIENT REFERRED TO:  Kamaljit Mao MD  [address] [facility] [address]      DISCHARGE MEDICATIONS:  Discharge Medication List as of 12/16/2021 11:52 AM          Discharge Medication List as of 12/16/2021 11:52 AM          Discharge Medication List as of 12/16/2021 11:52 AM          AMAURY Wall CNP    (Please note that portions of this note were completed with a voice recognition program. Efforts were made to edit the dictations but occasionally words are mis-transcribed.)           AMAURY Wall CNP  12/16/21 [id number]

## 2021-12-17 ENCOUNTER — TELEPHONE (OUTPATIENT)
Dept: INTERNAL MEDICINE CLINIC | Age: 66
End: 2021-12-17

## 2022-01-04 ENCOUNTER — TELEPHONE (OUTPATIENT)
Dept: CARDIOLOGY CLINIC | Age: 67
End: 2022-01-04

## 2022-01-04 ENCOUNTER — OFFICE VISIT (OUTPATIENT)
Dept: CARDIOLOGY CLINIC | Age: 67
End: 2022-01-04
Payer: MEDICARE

## 2022-01-04 VITALS
BODY MASS INDEX: 20.47 KG/M2 | HEART RATE: 80 BPM | SYSTOLIC BLOOD PRESSURE: 130 MMHG | DIASTOLIC BLOOD PRESSURE: 80 MMHG | WEIGHT: 143 LBS | HEIGHT: 70 IN

## 2022-01-04 DIAGNOSIS — R06.02 SOB (SHORTNESS OF BREATH): ICD-10-CM

## 2022-01-04 DIAGNOSIS — R26.89 BALANCE PROBLEM: ICD-10-CM

## 2022-01-04 DIAGNOSIS — R07.9 CHEST PAIN, UNSPECIFIED TYPE: ICD-10-CM

## 2022-01-04 DIAGNOSIS — I71.20 THORACIC AORTIC ANEURYSM WITHOUT RUPTURE: ICD-10-CM

## 2022-01-04 DIAGNOSIS — I49.3 PVC'S (PREMATURE VENTRICULAR CONTRACTIONS): ICD-10-CM

## 2022-01-04 DIAGNOSIS — I49.1 PAC (PREMATURE ATRIAL CONTRACTION): ICD-10-CM

## 2022-01-04 DIAGNOSIS — R07.9 CHEST PAIN, UNSPECIFIED TYPE: Primary | ICD-10-CM

## 2022-01-04 DIAGNOSIS — I10 ESSENTIAL HYPERTENSION: ICD-10-CM

## 2022-01-04 PROCEDURE — G8400 PT W/DXA NO RESULTS DOC: HCPCS | Performed by: NURSE PRACTITIONER

## 2022-01-04 PROCEDURE — 1123F ACP DISCUSS/DSCN MKR DOCD: CPT | Performed by: NURSE PRACTITIONER

## 2022-01-04 PROCEDURE — G8420 CALC BMI NORM PARAMETERS: HCPCS | Performed by: NURSE PRACTITIONER

## 2022-01-04 PROCEDURE — G8427 DOCREV CUR MEDS BY ELIG CLIN: HCPCS | Performed by: NURSE PRACTITIONER

## 2022-01-04 PROCEDURE — 4040F PNEUMOC VAC/ADMIN/RCVD: CPT | Performed by: NURSE PRACTITIONER

## 2022-01-04 PROCEDURE — G8484 FLU IMMUNIZE NO ADMIN: HCPCS | Performed by: NURSE PRACTITIONER

## 2022-01-04 PROCEDURE — 1036F TOBACCO NON-USER: CPT | Performed by: NURSE PRACTITIONER

## 2022-01-04 PROCEDURE — 99214 OFFICE O/P EST MOD 30 MIN: CPT | Performed by: NURSE PRACTITIONER

## 2022-01-04 PROCEDURE — 3017F COLORECTAL CA SCREEN DOC REV: CPT | Performed by: NURSE PRACTITIONER

## 2022-01-04 PROCEDURE — 1090F PRES/ABSN URINE INCON ASSESS: CPT | Performed by: NURSE PRACTITIONER

## 2022-01-04 NOTE — TELEPHONE ENCOUNTER
Patient in to see [facility] for a  follow-up, she is a patient of Dr. Bakari Moody. OV note from 7/2/2021  Doing well, no issues. She is on BB. Await monitor, not life limiting. She otherwise has no issues. Continue surveillance imaging of the TAA. She wants to avoid repetitive radiation, will discussed next year regarding MRA vs TTE. Discussed diet/exercise/BP/weight loss/health lifestyle choices/lipids; the patient understands the goals and will try to comply    Do you want patient scheduled for MRA or FLAKO to evaluate TAA. Last CTA done 2/2021.

## 2022-01-04 NOTE — PROGRESS NOTES
[facility] PROF[facility]AL [facility]  [facility]   [address] [place] [address] APRIL CHRISTIANSON II.[facility] [address]   Dept: [phone]   Dept Fax: [phone]   Loc: [phone]      Chief Complaint   Patient presents with    Follow-up    Dizziness   OV f/u for intermittent feeling off balance and intermittent chest discomfort in 76 y/o females with history of HTN, TAA 4.2cm, PACs/PVcs per event monitor, COVID 3/2021, moderna immunization 12/14/21. Reorts  Intermittent brief upper left chest pain with radiation to neck and shoulder at rest and with activity rated 3-4/10. Has exercised at times and not had any chest pain. Denies diaphoresis, nausea. Not sure if heart is racing during these episodes. Also has felt off balance especially when turning head side to side but has been having issues with hearing and ear wax and ? Inner ear issues. Also has felt different since receiving vaccination and unsure if r/t covid or covid immunization. Denies palpitations, sob, PAIGE, lightheadedness, dizziness or syncope.     Cardiologist:  Dr. Litzy Ellisix:   No fever, no chills, No fatigue or weight loss  Pulmonary:    No dyspnea, no wheezing  Cardiac:    Denies recent chest pain   GI:     No nausea or vomiting, no abdominal pain  Neuro:    No dizziness or light headedness  Musculoskeletal:  No recent active issues  Extremities:   No edema, good peripheral pulses      Past Medical History:   Diagnosis Date    Aortic aneurysm, thoracic (HCC)     dilatation    GERD (gastroesophageal reflux disease)     Hyperlipidemia     Hypertension     Mitral valve prolapse     Pneumonia     Pulmonary emphysema ([facility] [address] 10/22/2020    SOB (shortness of breath)     Tricuspid regurgitation     mild       Allergies   Allergen Reactions    Ciprofloxacin Itching       Current Outpatient Medications   Medication Sig Dispense Refill    omeprazole (PRILOSEC) 20 MG delayed release capsule Take 1 capsule by mouth every morning (before breakfast) 30 capsule 3    metoprolol succinate (TOPROL XL) 50 MG extended release tablet Take 1 tablet by mouth daily take 1 tablet by mouth once daily 90 tablet 3    aspirin 81 MG chewable tablet Take 81 mg by mouth daily Takes occas.  DM-Phenylephrine-Acetaminophen (MUCINEX SINUS-MAX MEGAN & PAIN PO) Take 2 tablets by mouth (Patient not taking: Reported on 10/20/2021)       No current facility-administered medications for this visit. Social History     Socioeconomic History    Marital status: Single     Spouse name: None    Number of children: None    Years of education: None    Highest education level: None   Occupational History    None   Tobacco Use    Smoking status: Never Smoker    Smokeless tobacco: Never Used   Vaping Use    Vaping Use: Never used   Substance and Sexual Activity    Alcohol use: Yes     Alcohol/week: 7.0 standard drinks     Types: 7 Glasses of wine per week     Comment: social    Drug use: No    Sexual activity: None   Other Topics Concern    None   Social History Narrative    None     Social Determinants of Health     Financial Resource Strain:     Difficulty of Paying Living Expenses: Not on file   Food Insecurity:     Worried About Running Out of Food in the Last Year: Not on file    Alfred of Food in the Last Year: Not on file   Transportation Needs:     Lack of Transportation (Medical): Not on file    Lack of Transportation (Non-Medical):  Not on file   Physical Activity:     Days of Exercise per Week: Not on file    Minutes of Exercise per Session: Not on file   Stress:     Feeling of Stress : Not on file   Social Connections:     Frequency of Communication with Friends and Family: Not on file    Frequency of Social Gatherings with Friends and Family: Not on file    Attends Orthodoxy Services: Not on file    Active Member of Clubs or Organizations: Not on file    Attends Club or Organization Meetings: Not on file    Marital Status: Not on file   Intimate Partner Violence:     Fear of Current or Ex-Partner: Not on file    Emotionally Abused: Not on file    Physically Abused: Not on file    Sexually Abused: Not on file   Housing Stability:     Unable to Pay for Housing in the Last Year: Not on file    Number of [place] in the Last Year: Not on file    Unstable Housing in the Last Year: Not on file       Family History   Problem Relation Age of Onset    Heart Disease Father 80        cabg    High Blood Pressure Mother     Cancer Mother     Dementia Mother     Heart Disease Paternal Grandfather         x2    Colon Cancer Neg Hx     Colon Polyps Neg Hx        Blood pressure 130/80, pulse 80, height 5' 10\" (1.778 m), weight 143 lb (64.9 kg). General:   Well developed, well nourished  Lungs:   Clear to auscultation  Heart:    Normal S1 S2, No murmur, rubs, or gallops  Abdomen:   Soft, non tender, no organomegalies, positive bowel sounds  Extremities:   No edema, no cyanosis, good peripheral pulses  Neurological:   Awake, alert, oriented. No obvious focal deficits  Musculoskeletal:  No obvious deformities    EKG: 10/10/21  Sinus rhythm with marked sinus arrhythmia  Possible Left atrial enlargement  Incomplete right bundle branch block  Borderline ECG  Confirmed by [employer]     Event monitor: 7/6/21  INDICATIONS:  Palpitation, shortness of breath.     FINDINGS:  Baseline rhythm is sinus rhythm with PACs. There were  episodes of PACs and PVCs that were associated with flutter and a  skipped heartbeat type sensation. These were relatively consistent. No  signs of SVT or VT noted. No findings of atrial fibrillation or flutter  noted. There were no findings of high-grade AV block.     SUMMARY:  Symptomatic PACs/PVCs. Kimberly aBin MD    Echo: 4/26/19  Summary   Ejection fraction is visually estimated at 60%.    Overall left ventricular function is normal.      Signature ----------------------------------------------------------------   Electronically signed by Tejinder Parra MD        Diagnosis Orders   1. Chest pain, unspecified type  NM MYOCARDIAL SPECT REST EXERCISE OR RX   2. Balance problem     3. PVC's (premature ventricular contractions)     4. PAC (premature atrial contraction)     5. Essential hypertension     6. Thoracic aortic aneurysm without rupture ([facility] [address]         Orders Placed This Encounter   Procedures    NM MYOCARDIAL SPECT REST EXERCISE OR RX     Standing Status:   Future     Standing Expiration Date:   1/4/2023     Order Specific Question:   Reason for Exam?     Answer:   Chest pain     Order Specific Question:   Reason for Exam?     Answer:   Shortness of breath     Order Specific Question:   Procedure Type     Answer:   Exercise   OV f/u for intermittent feeling off balance and intermittent chest discomfort    Reports Intermittent brief upper left chest pain with radiation to neck and shoulder at rest and with activity rated 3-4/10. Has exercised at times and not had any chest pain. Denies diaphoresis, nausea. Not sure if heart is racing during these episodes. Also has felt off balance especially when turning head side to side but has been having issues with hearing and ear wax and ? Inner ear issues. Also has felt different since receiving vaccination and unsure if r/t covid or covid immunization. Denies palpitations, sob, PAIGE, lightheadedness, dizziness or syncope. History of HTN  TAA 4.2cm  PACs, PVCs per event monitor  COVID 3/2021  moderna immunization 12/14/21. On toprol 50 mg xl, asa, prilosec  Stress test to further evaluate intermittent chest pain  F/U with Dr. Catherine Tarango   Will need annual TAA surveilance. Per patient, Dr. Catherine Tarango wants MRI. Dr. Catherine Tarango to decide MRI vs CTA  Follow with PCP regarding ear wax, ?  Inner ear symptoms    The patient has been educated on symptoms of heart disease that include chest pain, passing out, dizziness, etc. And to report them if there is any change or go to the emergency room. Discussed use, benefit, and side effects of prescribed medications. Reports compliance and denies side effects with. All patient questions answered. Pt voiced understanding. Instructed to continue current medications, diet and exercise. Continue risk factor modification and medical management. Patient agreed with treatment plan. Follow up as directed.     Continue Dr Weldon Bamberger current treatment plan  Follow up with Dr Kristin Pacheco as scheduled or sooner if needed

## 2022-01-06 NOTE — TELEPHONE ENCOUNTER
mra scheduled 01-19-21  Call to pt, offered to keep stress test as scheduled 01-13-21 or reschedule both tests in same day.   pt request both tests in same day  Rescheduled cardiolite stress test to follow [facility] 01-19-22  Pt informed, instructions reviewed

## 2022-01-19 NOTE — TELEPHONE ENCOUNTER
Pt called to confirm time of mra and stress test for tmw  Informed pt tests were scheduled for today    Rescheduled to 02-08-22.   Pt informed, instructions reviewed

## 2022-02-08 ENCOUNTER — HOSPITAL ENCOUNTER (OUTPATIENT)
Dept: MRI IMAGING | Age: 67
Discharge: HOME | End: 2022-02-08
Payer: MEDICARE

## 2022-02-08 ENCOUNTER — HOSPITAL ENCOUNTER (OUTPATIENT)
Dept: NON INVASIVE DIAGNOSTICS | Age: 67
Discharge: HOME | End: 2022-02-08
Payer: MEDICARE

## 2022-02-08 ENCOUNTER — HOSPITAL ENCOUNTER (OUTPATIENT)
Dept: NON INVASIVE DIAGNOSTICS | Age: 67
Discharge: HOME | End: 2022-02-08

## 2022-02-08 VITALS — HEIGHT: 70 IN | BODY MASS INDEX: 20.04 KG/M2 | WEIGHT: 140 LBS

## 2022-02-08 DIAGNOSIS — I71.20 THORACIC AORTIC ANEURYSM WITHOUT RUPTURE: ICD-10-CM

## 2022-02-08 LAB
LV EF: 80 %
LVEF MODALITY: NORMAL
POC CREATININE WHOLE BLOOD: 0.8 MG/DL (ref 0.5–1.2)

## 2022-02-08 PROCEDURE — 78452 HT MUSCLE IMAGE SPECT MULT: CPT

## 2022-02-08 PROCEDURE — A9500 TC99M SESTAMIBI: HCPCS | Performed by: NURSE PRACTITIONER

## 2022-02-08 PROCEDURE — 6360000004 HC RX CONTRAST MEDICATION: Performed by: INTERNAL MEDICINE

## 2022-02-08 PROCEDURE — A9579 GAD-BASE MR CONTRAST NOS,1ML: HCPCS | Performed by: INTERNAL MEDICINE

## 2022-02-08 PROCEDURE — 82565 ASSAY OF CREATININE: CPT

## 2022-02-08 PROCEDURE — 3430000000 HC RX DIAGNOSTIC RADIOPHARMACEUTICAL: Performed by: NURSE PRACTITIONER

## 2022-02-08 PROCEDURE — 93017 CV STRESS TEST TRACING ONLY: CPT | Performed by: INTERNAL MEDICINE

## 2022-02-08 PROCEDURE — 71555 MRI ANGIO CHEST W OR W/O DYE: CPT

## 2022-02-08 RX ADMIN — Medication 10.3 MILLICURIE: at 13:35

## 2022-02-08 RX ADMIN — Medication 34.5 MILLICURIE: at 14:53

## 2022-02-08 RX ADMIN — GADOTERIDOL 30 ML: 279.3 INJECTION, SOLUTION INTRAVENOUS at 13:10

## 2022-02-23 PROBLEM — R91.8 MULTIPLE LUNG NODULES ON CT: Status: ACTIVE | Noted: 2022-02-23

## 2022-02-23 PROBLEM — J45.20 MILD INTERMITTENT ASTHMA WITHOUT COMPLICATION: Status: ACTIVE | Noted: 2022-02-23

## 2022-02-24 ENCOUNTER — OFFICE VISIT (OUTPATIENT)
Dept: CARDIOLOGY CLINIC | Age: 67
End: 2022-02-24
Payer: MEDICARE

## 2022-02-24 VITALS
HEIGHT: 70 IN | DIASTOLIC BLOOD PRESSURE: 72 MMHG | SYSTOLIC BLOOD PRESSURE: 120 MMHG | BODY MASS INDEX: 20.62 KG/M2 | HEART RATE: 76 BPM | WEIGHT: 144 LBS

## 2022-02-24 DIAGNOSIS — I71.20 THORACIC AORTIC ANEURYSM WITHOUT RUPTURE: Primary | ICD-10-CM

## 2022-02-24 PROCEDURE — 1123F ACP DISCUSS/DSCN MKR DOCD: CPT | Performed by: INTERNAL MEDICINE

## 2022-02-24 PROCEDURE — G8420 CALC BMI NORM PARAMETERS: HCPCS | Performed by: INTERNAL MEDICINE

## 2022-02-24 PROCEDURE — 99212 OFFICE O/P EST SF 10 MIN: CPT | Performed by: INTERNAL MEDICINE

## 2022-02-24 PROCEDURE — 4040F PNEUMOC VAC/ADMIN/RCVD: CPT | Performed by: INTERNAL MEDICINE

## 2022-02-24 PROCEDURE — G8484 FLU IMMUNIZE NO ADMIN: HCPCS | Performed by: INTERNAL MEDICINE

## 2022-02-24 PROCEDURE — G8427 DOCREV CUR MEDS BY ELIG CLIN: HCPCS | Performed by: INTERNAL MEDICINE

## 2022-02-24 PROCEDURE — 1036F TOBACCO NON-USER: CPT | Performed by: INTERNAL MEDICINE

## 2022-02-24 PROCEDURE — G8400 PT W/DXA NO RESULTS DOC: HCPCS | Performed by: INTERNAL MEDICINE

## 2022-02-24 PROCEDURE — 1090F PRES/ABSN URINE INCON ASSESS: CPT | Performed by: INTERNAL MEDICINE

## 2022-02-24 PROCEDURE — 3017F COLORECTAL CA SCREEN DOC REV: CPT | Performed by: INTERNAL MEDICINE

## 2022-02-24 ASSESSMENT — ENCOUNTER SYMPTOMS
RHINORRHEA: 0
COUGH: 0
ABDOMINAL PAIN: 0
COLOR CHANGE: 0
BLOOD IN STOOL: 0
PHOTOPHOBIA: 0
SHORTNESS OF BREATH: 1
TROUBLE SWALLOWING: 0

## 2022-02-24 NOTE — PROGRESS NOTES
[address]  [place] [address]  Dept: [phone]  Dept Fax: [phone]  Loc: [phone]    Visit Date: 2/24/2022    Ms. Lola Brunson is a 77 y.o. female  who presented for:  Chief Complaint   Patient presents with    Follow-up    Results     stress and MRI       HPI:   HPI   71 yo F who presents for HTN, TAA 4.2 cm, who presents for follow-up after stress test and MRI. Patient has intermittent dizziness, seasonal shortness of breath and fatigue. Patient had COVID in late March, then had racing HR, monitor showing PACs/PVCs associated with flutter and skipped heartbeat type sensation but no AF, flutter or AV block noted. Patient had clean NM SPECT stress on 2/8/2022 showing no significant perfusion abnormalities, overall unremarkable other than occasional PVCs. She is on BB for occasional palpitations, now resolved with bb. She is on ASA as well. Patient did not want to take any more meds. Patient denies anginal symptoms, no CP, CAI, PND, syncope, swelling. Current Outpatient Medications:     omeprazole (PRILOSEC) 20 MG delayed release capsule, Take 1 capsule by mouth every morning (before breakfast), Disp: 30 capsule, Rfl: 3    metoprolol succinate (TOPROL XL) 50 MG extended release tablet, Take 1 tablet by mouth daily take 1 tablet by mouth once daily, Disp: 90 tablet, Rfl: 3    aspirin 81 MG chewable tablet, Take 81 mg by mouth daily Takes occas., Disp: , Rfl:     Past Medical History  Medhat Zaman  has a past medical history of Aortic aneurysm, thoracic ([facility] [address] GERD (gastroesophageal reflux disease), Hyperlipidemia, Hypertension, Mild intermittent asthma without complication, Mitral valve prolapse, Pneumonia, Pulmonary emphysema (HCC), SOB (shortness of breath), and Tricuspid regurgitation. Social History  Medhat Zaman  reports that she has never smoked.  She has never used smokeless tobacco. She reports current alcohol use of about 7.0 standard drinks of alcohol per week. She reports that she does not use drugs. Family History  Abdelrahman Christian family history includes Cancer in her mother; Dementia in her mother; Heart Disease in her paternal grandfather; Heart Disease (age of onset: 80) in her father; High Blood Pressure in her mother. There is no family history of bicuspid aortic valve, aneurysms, heart transplant, pacemakers, defibrillators, or sudden cardiac death. Past Surgical History   Past Surgical History:   Procedure Laterality Date    CARDIAC CATHETERIZATION  3/4/2010     SECTION      KNEE SURGERY      right knee    TONSILLECTOMY         Review of Systems   Review of Systems   Constitutional: Positive for fatigue. Negative for fever. HENT: Negative for rhinorrhea and trouble swallowing. Eyes: Negative for photophobia and visual disturbance. Respiratory: Positive for shortness of breath. Negative for cough. Cardiovascular: Negative for chest pain, palpitations and leg swelling. Gastrointestinal: Negative for abdominal pain and blood in stool. Endocrine: Negative for cold intolerance and heat intolerance. Genitourinary: Negative for flank pain and hematuria. Musculoskeletal: Negative for arthralgias and myalgias. Skin: Negative for color change and wound. Allergic/Immunologic: Negative for immunocompromised state. Neurological: Negative for syncope and headaches. Hematological: Negative for adenopathy. Does not bruise/bleed easily. Psychiatric/Behavioral: Negative for agitation and hallucinations. Objective:     Ht 5' 10\" (1.778 m)   Wt 144 lb (65.3 kg)   BMI 20.66 kg/m²     Wt Readings from Last 3 Encounters:   22 144 lb (65.3 kg)   22 140 lb (63.5 kg)   22 143 lb (64.9 kg)     BP Readings from Last 3 Encounters:   22 130/80   21 (!) 140/68   10/20/21 115/76       Nursing note and vitals reviewed.     Physical Exam   Constitutional: Oriented to person, place, and time. Appears well-developed and well-nourished. HENT:   Head: Normocephalic and atraumatic. Eyes: EOM are normal. Pupils are equal, round, and reactive to light. Neck: Normal range of motion. Neck supple. No JVD present. Cardiovascular: Normal rate, regular rhythm, normal heart sounds and intact distal pulses. No murmur heard. Pulmonary/Chest: Effort normal and breath sounds normal. No respiratory distress. No wheezes. No rales. Abdominal: Soft. Bowel sounds are normal. No distension. There is no tenderness. Musculoskeletal: Normal range of motion. No edema. Neurological: Alert and oriented to person, place, and time. No cranial nerve deficit. Coordination normal.   Skin: Skin is warm and dry. Psychiatric: Normal mood and affect.        No results found for: CKTOTAL, CKMB, CKMBINDEX    Lab Results   Component Value Date    WBC 6.0 10/10/2021    RBC 4.52 10/10/2021    HGB 14.0 10/10/2021    HCT 44.0 10/10/2021    MCV 97.3 10/10/2021    MCH 31.0 10/10/2021    MCHC 31.8 10/10/2021    RDW 13.6 08/08/2017     10/10/2021    MPV 10.9 10/10/2021       Lab Results   Component Value Date     10/10/2021    K 3.8 10/10/2021     10/10/2021    CO2 26 10/10/2021    BUN 11 10/10/2021    LABALBU 5.0 10/10/2021    CREATININE 0.8 10/10/2021    CALCIUM 9.7 10/10/2021    LABGLOM 72 10/10/2021    GLUCOSE 115 10/10/2021       Lab Results   Component Value Date    ALKPHOS 53 10/10/2021    ALT 18 10/10/2021    AST 32 10/10/2021    PROT 7.8 10/10/2021    BILITOT 0.5 10/10/2021    LABALBU 5.0 10/10/2021       No results found for: MG    No results found for: INR, PROTIME      No results found for: LABA1C    Lab Results   Component Value Date    TRIG 62 02/04/2013    HDL 84 02/04/2013    LDLCALC 113 02/04/2013       Lab Results   Component Value Date    TSH 1.730 07/02/2021         Testing Reviewed:      I have individually reviewed the cardiac test below:    ECHO: Results for orders placed during the hospital encounter of 04/26/19    Echo 2D w doppler w color complete    Narrative  Transthoracic Echocardiography Report (TTE)    Demographics    Patient Name   Tory Quezada           Gender              Female  Marley Cuevas    MR #           [id number]          Race                    Ethnicity    Account #      [de-identified]          Room Number    Accession      [id number]          Date of Study       04/26/2019  Number    Date of Birth  1955         Referring Physician MD Reji Troy MD    Age            61 year(s)         Ne Brar MD  Physician           Sheridan Boogie MD    Procedure    Type of Study    TTE procedure:ECHOCARDIOGRAM COMPLETE 2D W DOPPLER W COLOR. Procedure Date  Date: 04/26/2019 Start: 08:46 AM    Study Location: Echo Lab  Technical Quality: Adequate visualization    Indications:Orthopnea. Additional Medical History:Shortness of breath, Mitral valve prolapse,  Hypertension, Hyperlipidemia, GERD, Thoracic aortic aneurysm, SVT    Patient Status: Routine    Height: 68.9 inches Weight: 138 pounds BSA: 1.76 m^2 BMI: 20.44 kg/m^2    BP: 110/64 mmHg    Conclusions    Summary  Ejection fraction is visually estimated at 60%. Overall left ventricular function is normal.    Signature    ----------------------------------------------------------------  Electronically signed by Sheridan Boogie MD (Interpreting  physician) on 04/26/2019 at 06:29 PM  ----------------------------------------------------------------    Findings    Mitral Valve  Trace mitral regurgitation is present. Aortic Valve  The aortic valve was trileaflet with normal thickness and cuspal  separation. DOPPLER: Transaortic velocity was within the normal range with  no evidence of aortic stenosis. There was no evidence of aortic  regurgitation.     Tricuspid Valve  The tricuspid valve structure was normal with normal leaflet separation. DOPPLER: There was no evidence of tricuspid stenosis. There was no  evidence of tricuspid regurgitation. Pulmonic Valve  The pulmonic valve was not well visualized . Trivial pulmonic regurgitation visualized. Left Atrium  Left atrial size was normal.    Left Ventricle  Ejection fraction is visually estimated at 60%. Overall left ventricular function is normal.    Right Atrium  Right atrial size was normal.    Right Ventricle  The right ventricular size was normal with normal systolic function and  wall thickness. Pericardial Effusion  The pericardium was normal in appearance with no evidence of a pericardial  effusion. Pleural Effusion  No evidence of pleural effusion. Aorta / Great Vessels  -Aortic root dimension within normal limits.  -The Pulmonary artery is within normal limits. -IVC size is within normal limits with normal respiratory phasic changes.     M-Mode/2D Measurements & Calculations    LV Diastolic    LV Systolic Dimension: 3  AV Cusp Separation: 2.1 cmLA  Dimension: 4.9  cm                        Dimension: 2.7 cmAO Root  cm              LV Volume Diastolic: 072  Dimension: 2.9 cmLA Area: 24.8  LV FS:38.8 %    ml                        cm^2  LV PW           LV Volume Systolic: 35 ml  Diastolic: 0.8  LV EDV/LV EDV Index: 113  cm              ml/64 m^2LV ESV/LV ESV  Septum          Index: 35 ml/20 m^2       RV Diastolic Dimension: 3 cm  Diastolic: 0.7  EF Calculated: 69 %  cm                                        LA/Aorta: 0.93    LA volume/Index: 74.5 ml /42m^2    Doppler Measurements & Calculations    MV Peak E-Wave: 62.9 cm/s  AV Peak Velocity: 119  LVOT Peak Velocity: 104  MV Peak A-Wave: 79.8 cm/s  cm/s                   cm/s  MV E/A Ratio: 0.79         AV Peak Gradient: 5.66 LVOT Peak Gradient: 4  MV Peak Gradient: 1.58     mmHg                   mmHg  mmHg  TV Peak E-Wave: 63.6  MV Deceleration Time: 327 cm/s  msec                                              TV Peak A-Wave: 50.7  IVRT: 85 msec          cm/s    MV E' Septal Velocity: 6                          TV Peak Gradient: 1.62  cm/s                       AV DVI (Vmax):0.87     mmHg  MV A' Septal Velocity: 8.8                        TR Velocity:251 cm/s  cm/s                                              TR Gradient:25.2 mmHg  MV E' Lateral Velocity:                           PV Peak Velocity: 64.3  8.2 cm/s                                          cm/s  MV A' Lateral Velocity:                           PV Peak Gradient: 1.65  9.4 cm/s                                          mmHg  E/E' septal: 10.48  E/E' lateral: 7.67  MR Velocity: 345 cm/s                             CA ED Velocity: 128 cm/s    [email or web address]  [id number]       Assessment/Plan   TAA 4 cm, on MRA 2/2021 -previously measured 4.1 cm, stable no acute findings. Recent palpitations s/p COVID resolved on Toprol-XL 50mg qd -event monitor shows PACs/PVCs associated with flutter and skipped heartbeat type sensation but no AF, flutter or AV block noted. Doing well, no issues. She is on BB, no side effects but does occasionally feel tired. She otherwise has no issues. Continue surveillance imaging of the TAA. She wants to avoid repetitive radiation, will discussed next year regarding repeating MRA vs TTE. Discussed diet/exercise/BP/weight loss/health lifestyle choices/lipids; the patient understands the goals and will try to comply. Disposition:  1 year       Electronically signed by Michele Donohue MD   2/24/2022 at 10:28 AM EDT    Attending Supervising Physician's [address] [place] [address] performed a history and physical examination on the patient and discussed the management with the resident physician.  I reviewed and agree with the findings and plan as documented in the resident's note except for as noted below. No major issues, MRA stable for TAA. She avoids excessive weights and bearing down. She is on ASA/BB. D/w patient, she wants to switch to periodic evaluations given no growth. F/u 1 year.        Electronically signed by Fernando Skelton MD on 2/24/22 at 11:32 AM EST

## 2022-03-25 ENCOUNTER — TELEPHONE (OUTPATIENT)
Dept: PULMONOLOGY | Age: 67
End: 2022-03-25

## 2022-03-25 DIAGNOSIS — J43.9 PULMONARY EMPHYSEMA, UNSPECIFIED EMPHYSEMA TYPE (HCC): ICD-10-CM

## 2022-03-25 DIAGNOSIS — J45.20 MILD INTERMITTENT ASTHMA WITHOUT COMPLICATION: Primary | ICD-10-CM

## 2022-03-25 DIAGNOSIS — R06.02 SOB (SHORTNESS OF BREATH): ICD-10-CM

## 2022-03-25 NOTE — TELEPHONE ENCOUNTER
The pft lab called stating patient is scheduled for her pft on Monday and her order is  and they need another order to be put in Please. Thank you.

## 2022-04-15 DIAGNOSIS — J40 BRONCHITIS: Primary | ICD-10-CM

## 2022-04-15 RX ORDER — AZITHROMYCIN 250 MG/1
250 TABLET, FILM COATED ORAL SEE ADMIN INSTRUCTIONS
Qty: 6 TABLET | Refills: 0 | Status: SHIPPED | OUTPATIENT
Start: 2022-04-15 | End: 2022-04-20

## 2022-05-24 ENCOUNTER — HOSPITAL ENCOUNTER (EMERGENCY)
Age: 67
Discharge: HOME | End: 2022-05-24
Payer: MEDICARE

## 2022-05-24 VITALS
DIASTOLIC BLOOD PRESSURE: 75 MMHG | SYSTOLIC BLOOD PRESSURE: 131 MMHG | HEART RATE: 60 BPM | TEMPERATURE: 97.8 F | WEIGHT: 140 LBS | RESPIRATION RATE: 16 BRPM | BODY MASS INDEX: 20.04 KG/M2 | HEIGHT: 70 IN | OXYGEN SATURATION: 96 %

## 2022-05-24 DIAGNOSIS — L24.7 IRRITANT CONTACT DERMATITIS DUE TO PLANTS, EXCEPT FOOD: Primary | ICD-10-CM

## 2022-05-24 PROCEDURE — 96372 THER/PROPH/DIAG INJ SC/IM: CPT

## 2022-05-24 PROCEDURE — 6360000002 HC RX W HCPCS: Performed by: NURSE PRACTITIONER

## 2022-05-24 PROCEDURE — 99213 OFFICE O/P EST LOW 20 MIN: CPT | Performed by: NURSE PRACTITIONER

## 2022-05-24 PROCEDURE — 99213 OFFICE O/P EST LOW 20 MIN: CPT

## 2022-05-24 RX ORDER — METHYLPREDNISOLONE ACETATE 80 MG/ML
60 INJECTION, SUSPENSION INTRA-ARTICULAR; INTRALESIONAL; INTRAMUSCULAR; SOFT TISSUE ONCE
Status: COMPLETED | OUTPATIENT
Start: 2022-05-24 | End: 2022-05-24

## 2022-05-24 RX ORDER — PREDNISONE 10 MG/1
TABLET ORAL
Qty: 40 TABLET | Refills: 0 | Status: SHIPPED | OUTPATIENT
Start: 2022-05-24 | End: 2022-06-03

## 2022-05-24 RX ADMIN — METHYLPREDNISOLONE ACETATE 60 MG: 80 INJECTION, SUSPENSION INTRA-ARTICULAR; INTRALESIONAL; INTRAMUSCULAR; SOFT TISSUE at 14:51

## 2022-05-24 ASSESSMENT — ENCOUNTER SYMPTOMS: COLOR CHANGE: 1

## 2022-05-24 ASSESSMENT — PAIN - FUNCTIONAL ASSESSMENT: PAIN_FUNCTIONAL_ASSESSMENT: NONE - DENIES PAIN

## 2022-05-24 NOTE — ED PROVIDER NOTES
section. CURRENT MEDICATIONS       Previous Medications    METOPROLOL SUCCINATE (TOPROL XL) 50 MG EXTENDED RELEASE TABLET    Take 1 tablet by mouth daily take 1 tablet by mouth once daily       ALLERGIES     Patient is is allergic to ciprofloxacin. Patients   Immunization History   Administered Date(s) Administered    COVID-19, US Vaccine, Vaccine Unspecified 12/20/2021    Influenza Whole 01/10/2013    Tdap (Boostrix, Adacel) 09/11/2019       FAMILY HISTORY     Patient's family history includes Cancer in her mother; Dementia in her mother; Heart Disease in her paternal grandfather; Heart Disease (age of onset: 80) in her father; High Blood Pressure in her mother. SOCIAL HISTORY     Patient  reports that she has never smoked. She has never used smokeless tobacco. She reports current alcohol use of about 7.0 standard drinks of alcohol per week. She reports that she does not use drugs. PHYSICAL EXAM     ED TRIAGE VITALS  BP: 131/75, Temp: 97.8 °F (36.6 °C), Pulse: 64, Resp: 18, SpO2: 96 %,Estimated body mass index is 20.09 kg/m² as calculated from the following:    Height as of this encounter: 5' 10\" (1.778 m). Weight as of this encounter: 140 lb (63.5 kg). ,No LMP recorded. Patient is postmenopausal.    Physical Exam  Vitals and nursing note reviewed. Constitutional:       General: She is not in acute distress. Appearance: Normal appearance. She is well-developed. She is not ill-appearing, toxic-appearing or diaphoretic. HENT:      Head: Normocephalic. Right Ear: External ear normal.      Left Ear: External ear normal.      Nose: Nose normal.   Eyes:      Pupils: Pupils are equal, round, and reactive to light. Cardiovascular:      Rate and Rhythm: Normal rate. Pulmonary:      Effort: Pulmonary effort is normal.   Musculoskeletal:         General: No swelling or tenderness. Cervical back: Full passive range of motion without pain, normal range of motion and neck supple.    Skin: General: Skin is warm and dry. Capillary Refill: Capillary refill takes less than 2 seconds. Findings: Erythema and rash present. Rash is vesicular. Comments: Both forearms, both lower legs, torso. Patient has scattered vesicular lesions noted to the mentioned areas. Patient complains of itch. Neurological:      Mental Status: She is alert and oriented to person, place, and time. Psychiatric:         Mood and Affect: Mood normal.         Behavior: Behavior normal. Behavior is cooperative. DIAGNOSTIC RESULTS     Labs:No results found for this visit on 05/24/22. IMAGING:    No orders to display         EKG:      URGENT CARE COURSE:     Vitals:    05/24/22 1436   BP: 131/75   Pulse: 64   Resp: 18   Temp: 97.8 °F (36.6 °C)   SpO2: 96%   Weight: 140 lb (63.5 kg)   Height: 5' 10\" (1.778 m)       Medications   methylPREDNISolone acetate (DEPO-MEDROL) injection 60 mg (60 mg IntraMUSCular Given 5/24/22 1451)            PROCEDURES:  None    FINAL IMPRESSION      1. Irritant contact dermatitis due to plants, except food          DISPOSITION/ PLAN     Take Medication as Directed  Benadryl for itch, Don't scratch  Monitor for any increase in size or spreading  Monitor for fever or chills  Keep drainage covered if any. Follow up with your PCP or return as needed  Or go to the emergency Department      PATIENT REFERRED TO:  Leland Sapp MD  [place] [address] / [facility] [address]      DISCHARGE MEDICATIONS:  New Prescriptions    PREDNISONE (DELTASONE) 10 MG TABLET    40 mg's po x 4 days, then 30 mg's po x 4 days, 20 mg's x 4 days, the 10 mgs po x 4 days       Discontinued Medications    ASPIRIN 81 MG CHEWABLE TABLET    Take 81 mg by mouth daily Takes occas.     OMEPRAZOLE (PRILOSEC) 20 MG DELAYED RELEASE CAPSULE    Take 1 capsule by mouth every morning (before breakfast)       Current Discharge Medication List          Richard Haque, APRN - CNP    (Please note that portions of this note were completed with a voice recognition program. Efforts were made to edit the dictations but occasionally words are mis-transcribed.)           Lazarus Salcido, APRN - CNP  05/24/22 9941

## 2022-05-25 ENCOUNTER — TELEPHONE (OUTPATIENT)
Dept: INTERNAL MEDICINE CLINIC | Age: 67
End: 2022-05-25

## 2022-07-08 ENCOUNTER — CARE COORDINATION (OUTPATIENT)
Dept: CARE COORDINATION | Age: 67
End: 2022-07-08

## 2022-07-08 NOTE — CARE COORDINATION
Attempted to reach [facility] today for enrollment in care coordination. No answer. Message left with contact info to return call.

## 2022-07-15 ENCOUNTER — CARE COORDINATION (OUTPATIENT)
Dept: CARE COORDINATION | Age: 67
End: 2022-07-15

## 2022-09-12 RX ORDER — METOPROLOL SUCCINATE 50 MG/1
50 TABLET, EXTENDED RELEASE ORAL DAILY
Qty: 90 TABLET | Refills: 1 | Status: SHIPPED | OUTPATIENT
Start: 2022-09-12

## 2023-03-27 RX ORDER — METOPROLOL SUCCINATE 50 MG/1
50 TABLET, EXTENDED RELEASE ORAL DAILY
Qty: 90 TABLET | Refills: 1 | Status: SHIPPED | OUTPATIENT
Start: 2023-03-27

## 2023-05-16 ENCOUNTER — OFFICE VISIT (OUTPATIENT)
Dept: CARDIOLOGY CLINIC | Age: 68
End: 2023-05-16
Payer: MEDICARE

## 2023-05-16 VITALS
DIASTOLIC BLOOD PRESSURE: 74 MMHG | BODY MASS INDEX: 20.64 KG/M2 | HEIGHT: 70 IN | HEART RATE: 69 BPM | SYSTOLIC BLOOD PRESSURE: 127 MMHG | WEIGHT: 144.2 LBS

## 2023-05-16 DIAGNOSIS — I71.20 THORACIC AORTIC ANEURYSM WITHOUT RUPTURE, UNSPECIFIED PART (HCC): Primary | ICD-10-CM

## 2023-05-16 DIAGNOSIS — R06.02 SOB (SHORTNESS OF BREATH): ICD-10-CM

## 2023-05-16 DIAGNOSIS — R00.2 PALPITATIONS: ICD-10-CM

## 2023-05-16 DIAGNOSIS — R07.9 CHEST PAIN, UNSPECIFIED TYPE: ICD-10-CM

## 2023-05-16 PROCEDURE — 3017F COLORECTAL CA SCREEN DOC REV: CPT | Performed by: NURSE PRACTITIONER

## 2023-05-16 PROCEDURE — 3074F SYST BP LT 130 MM HG: CPT | Performed by: NURSE PRACTITIONER

## 2023-05-16 PROCEDURE — 1123F ACP DISCUSS/DSCN MKR DOCD: CPT | Performed by: NURSE PRACTITIONER

## 2023-05-16 PROCEDURE — G8420 CALC BMI NORM PARAMETERS: HCPCS | Performed by: NURSE PRACTITIONER

## 2023-05-16 PROCEDURE — G8400 PT W/DXA NO RESULTS DOC: HCPCS | Performed by: NURSE PRACTITIONER

## 2023-05-16 PROCEDURE — 1036F TOBACCO NON-USER: CPT | Performed by: NURSE PRACTITIONER

## 2023-05-16 PROCEDURE — 93000 ELECTROCARDIOGRAM COMPLETE: CPT | Performed by: NURSE PRACTITIONER

## 2023-05-16 PROCEDURE — 1090F PRES/ABSN URINE INCON ASSESS: CPT | Performed by: NURSE PRACTITIONER

## 2023-05-16 PROCEDURE — 3078F DIAST BP <80 MM HG: CPT | Performed by: NURSE PRACTITIONER

## 2023-05-16 PROCEDURE — 99213 OFFICE O/P EST LOW 20 MIN: CPT | Performed by: NURSE PRACTITIONER

## 2023-05-16 PROCEDURE — G8427 DOCREV CUR MEDS BY ELIG CLIN: HCPCS | Performed by: NURSE PRACTITIONER

## 2023-05-16 ASSESSMENT — ENCOUNTER SYMPTOMS
PHOTOPHOBIA: 0
COUGH: 0
TROUBLE SWALLOWING: 0
COLOR CHANGE: 0
BLOOD IN STOOL: 0
ABDOMINAL PAIN: 0
SHORTNESS OF BREATH: 1
RHINORRHEA: 0

## 2023-05-29 NOTE — PATIENT INSTRUCTIONS
Continue current medications as prescribed. Have echo done to follow known aneurysm. Follow-up with your PCP as scheduled. Follow-up with      as scheduled or sooner if need.

## 2023-05-31 ENCOUNTER — HOSPITAL ENCOUNTER (OUTPATIENT)
Dept: NON INVASIVE DIAGNOSTICS | Age: 68
Discharge: HOME | End: 2023-05-31
Payer: MEDICARE

## 2023-05-31 DIAGNOSIS — I71.20 THORACIC AORTIC ANEURYSM WITHOUT RUPTURE, UNSPECIFIED PART (HCC): ICD-10-CM

## 2023-05-31 DIAGNOSIS — R07.9 CHEST PAIN, UNSPECIFIED TYPE: ICD-10-CM

## 2023-05-31 DIAGNOSIS — R00.2 PALPITATIONS: ICD-10-CM

## 2023-05-31 DIAGNOSIS — R06.02 SOB (SHORTNESS OF BREATH): ICD-10-CM

## 2023-05-31 LAB
LV EF: 63 %
LVEF MODALITY: NORMAL

## 2023-05-31 PROCEDURE — 93306 TTE W/DOPPLER COMPLETE: CPT

## 2023-06-23 ENCOUNTER — TELEPHONE (OUTPATIENT)
Dept: CARDIOLOGY CLINIC | Age: 68
End: 2023-06-23

## 2023-07-03 RX ORDER — METOPROLOL SUCCINATE 50 MG/1
75 TABLET, EXTENDED RELEASE ORAL DAILY
Qty: 90 TABLET | Refills: 1
Start: 2023-07-03

## 2023-09-06 ENCOUNTER — OFFICE VISIT (OUTPATIENT)
Dept: CARDIOLOGY CLINIC | Age: 68
End: 2023-09-06
Payer: MEDICARE

## 2023-09-06 VITALS
HEIGHT: 70 IN | HEART RATE: 61 BPM | DIASTOLIC BLOOD PRESSURE: 79 MMHG | SYSTOLIC BLOOD PRESSURE: 121 MMHG | BODY MASS INDEX: 20.5 KG/M2 | WEIGHT: 143.2 LBS

## 2023-09-06 DIAGNOSIS — I10 PRIMARY HYPERTENSION: ICD-10-CM

## 2023-09-06 DIAGNOSIS — R42 DIZZINESS: Primary | ICD-10-CM

## 2023-09-06 PROCEDURE — G8427 DOCREV CUR MEDS BY ELIG CLIN: HCPCS | Performed by: STUDENT IN AN ORGANIZED HEALTH CARE EDUCATION/TRAINING PROGRAM

## 2023-09-06 PROCEDURE — 99214 OFFICE O/P EST MOD 30 MIN: CPT | Performed by: STUDENT IN AN ORGANIZED HEALTH CARE EDUCATION/TRAINING PROGRAM

## 2023-09-06 PROCEDURE — G8420 CALC BMI NORM PARAMETERS: HCPCS | Performed by: STUDENT IN AN ORGANIZED HEALTH CARE EDUCATION/TRAINING PROGRAM

## 2023-09-06 PROCEDURE — 3074F SYST BP LT 130 MM HG: CPT | Performed by: STUDENT IN AN ORGANIZED HEALTH CARE EDUCATION/TRAINING PROGRAM

## 2023-09-06 PROCEDURE — 1123F ACP DISCUSS/DSCN MKR DOCD: CPT | Performed by: STUDENT IN AN ORGANIZED HEALTH CARE EDUCATION/TRAINING PROGRAM

## 2023-09-06 PROCEDURE — G8400 PT W/DXA NO RESULTS DOC: HCPCS | Performed by: STUDENT IN AN ORGANIZED HEALTH CARE EDUCATION/TRAINING PROGRAM

## 2023-09-06 PROCEDURE — 1090F PRES/ABSN URINE INCON ASSESS: CPT | Performed by: STUDENT IN AN ORGANIZED HEALTH CARE EDUCATION/TRAINING PROGRAM

## 2023-09-06 PROCEDURE — 1036F TOBACCO NON-USER: CPT | Performed by: STUDENT IN AN ORGANIZED HEALTH CARE EDUCATION/TRAINING PROGRAM

## 2023-09-06 PROCEDURE — 3078F DIAST BP <80 MM HG: CPT | Performed by: STUDENT IN AN ORGANIZED HEALTH CARE EDUCATION/TRAINING PROGRAM

## 2023-09-06 PROCEDURE — 3017F COLORECTAL CA SCREEN DOC REV: CPT | Performed by: STUDENT IN AN ORGANIZED HEALTH CARE EDUCATION/TRAINING PROGRAM

## 2023-09-06 RX ORDER — MELOXICAM 15 MG/1
TABLET ORAL
COMMUNITY
Start: 2023-09-05

## 2023-09-06 NOTE — PROGRESS NOTES
[facility] PROFESSIONAL SERVICES  [facility] [place]   [address]   [address]   [address] [facility] [address] [address]   Dept: [phone]   Dept Fax: [phone]: [phone]      No chief complaint on file. Cardiologist:  Dr. Varsha Hernandez  80 yo female presents for BP concerns, fatigue. Hx of HTN, TAA. Had stress test and MRA early 2022. Having some dizziness feelings/off balance lately, seems to come from her head. No muscle weakness. Feeling 'off' lately. BP has been okay. No higher than 140s/90s. Usually 120-130. Worried about carotids and her aneurysm. Taking toprol without issue. Noticies palpitations still but much better than before. General:   No fever, no chills, no weight loss, no fatigue  Pulmonary:    No dyspnea, no wheezing  Cardiac:    Denies recent chest pain , + palpitations  GI:     No nausea or vomiting, no abdominal pain  Neuro:     ++ dizziness or light headedness  Musculoskeletal:  No recent active issues  Extremities:   No edema      Past Medical History:   Diagnosis Date    Aortic aneurysm, thoracic (HCC)     dilatation    GERD (gastroesophageal reflux disease)     Hyperlipidemia     Hypertension     Mild intermittent asthma without complication 6/58/5121    Mitral valve prolapse     Pneumonia     Pulmonary emphysema ([address]) 10/22/2020    SOB (shortness of breath)     Tricuspid regurgitation     mild       Allergies   Allergen Reactions    Ciprofloxacin Itching       Current Outpatient Medications   Medication Sig Dispense Refill    meloxicam (MOBIC) 15 MG tablet 1 tab(s) orally once a day for 30 day(s)      metoprolol succinate (TOPROL XL) 50 MG extended release tablet Take 1.5 tablets by mouth daily take 1 tablet by mouth once daily 90 tablet 1     No current facility-administered medications for this visit.        Social History     Socioeconomic History    Marital status: Single   Tobacco Use    Smoking status: Never    Smokeless tobacco: Never   Vaping Use    Vaping Use:

## 2023-09-07 ENCOUNTER — TELEPHONE (OUTPATIENT)
Dept: CARDIOLOGY CLINIC | Age: 68
End: 2023-09-07

## 2023-09-07 ENCOUNTER — HOSPITAL ENCOUNTER (OUTPATIENT)
Dept: INTERVENTIONAL RADIOLOGY/VASCULAR | Age: 68
Discharge: HOME | End: 2023-09-07
Payer: MEDICARE

## 2023-09-07 DIAGNOSIS — I10 PRIMARY HYPERTENSION: ICD-10-CM

## 2023-09-07 DIAGNOSIS — R42 DIZZINESS: ICD-10-CM

## 2023-09-07 PROCEDURE — 93880 EXTRACRANIAL BILAT STUDY: CPT

## 2023-09-07 NOTE — TELEPHONE ENCOUNTER
Result note for VL DUP CAROTID BILATERAL    ----- Message from Yudi Broussard PA-C sent at 9/7/2023 12:36 PM EDT -----  Normal. Would f/u with PCP for the dizziness.

## 2023-10-04 ENCOUNTER — HOSPITAL ENCOUNTER (OUTPATIENT)
Dept: CT IMAGING | Age: 68
Discharge: HOME | End: 2023-10-04
Attending: FAMILY MEDICINE
Payer: MEDICARE

## 2023-10-04 DIAGNOSIS — E78.49 OTHER HYPERLIPIDEMIA: ICD-10-CM

## 2023-10-04 PROCEDURE — 75571 CT HRT W/O DYE W/CA TEST: CPT

## 2023-10-19 RX ORDER — METOPROLOL SUCCINATE 50 MG/1
50 TABLET, EXTENDED RELEASE ORAL DAILY
Qty: 90 TABLET | Refills: 0 | Status: SHIPPED | OUTPATIENT
Start: 2023-10-19

## 2023-10-19 NOTE — TELEPHONE ENCOUNTER
Patient called and said she is currently in [facility] and is going to be leaving to go to [facility] on Saturday 10/21/2023. She is almost out of this medication and needs it before she leaves. She is asking if she can have a 3 month supply sent to the [facility] in [facility] for her and then when she returns to [place] she will contact the office to request refills for the next time.      [facility]/PHARMACY [place] - [address] - F [phone]

## 2023-10-19 NOTE — TELEPHONE ENCOUNTER
Script in chart says Toprol 50 mg Take 1.5 tablets by mouth daily take 1 tablet by mouth once daily. Patient confirmed she stakes 50 mg daily. Script pended.

## 2023-11-06 ENCOUNTER — HOSPITAL ENCOUNTER (OUTPATIENT)
Dept: AUDIOLOGY | Age: 68
Discharge: HOME | End: 2023-11-06
Payer: MEDICARE

## 2023-11-06 PROCEDURE — 92537 CALORIC VSTBLR TEST W/REC: CPT | Performed by: AUDIOLOGIST

## 2023-11-06 PROCEDURE — 92567 TYMPANOMETRY: CPT | Performed by: AUDIOLOGIST

## 2023-11-06 PROCEDURE — 92540 BASIC VESTIBULAR EVALUATION: CPT | Performed by: AUDIOLOGIST
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
## 2023-11-09 ENCOUNTER — TELEPHONE (OUTPATIENT)
Dept: CARDIOLOGY CLINIC | Age: 68
End: 2023-11-09

## 2023-11-09 NOTE — TELEPHONE ENCOUNTER
Patient calling PCP started on cholesterol meds. Would like appt with Varsha Hernandez to discuss. Lm for patient to call back.

## 2023-11-27 ENCOUNTER — HOSPITAL ENCOUNTER (OUTPATIENT)
Dept: MRI IMAGING | Age: 68
Discharge: HOME | End: 2023-11-27
Attending: FAMILY MEDICINE
Payer: MEDICARE

## 2023-11-27 DIAGNOSIS — R94.120 ABNORMAL AUDIOGRAM: ICD-10-CM

## 2023-11-27 DIAGNOSIS — R42 DIZZINESS: ICD-10-CM

## 2023-11-27 PROCEDURE — 70551 MRI BRAIN STEM W/O DYE: CPT

## 2023-12-11 ENCOUNTER — OFFICE VISIT (OUTPATIENT)
Dept: CARDIOLOGY CLINIC | Age: 68
End: 2023-12-11
Payer: MEDICARE

## 2023-12-11 VITALS
SYSTOLIC BLOOD PRESSURE: 121 MMHG | DIASTOLIC BLOOD PRESSURE: 80 MMHG | HEART RATE: 69 BPM | HEIGHT: 70 IN | BODY MASS INDEX: 20.76 KG/M2 | WEIGHT: 145 LBS

## 2023-12-11 DIAGNOSIS — I71.20 THORACIC AORTIC ANEURYSM WITHOUT RUPTURE, UNSPECIFIED PART (HCC): Primary | ICD-10-CM

## 2023-12-11 PROCEDURE — 1090F PRES/ABSN URINE INCON ASSESS: CPT | Performed by: INTERNAL MEDICINE

## 2023-12-11 PROCEDURE — G8484 FLU IMMUNIZE NO ADMIN: HCPCS | Performed by: INTERNAL MEDICINE

## 2023-12-11 PROCEDURE — 3074F SYST BP LT 130 MM HG: CPT | Performed by: INTERNAL MEDICINE

## 2023-12-11 PROCEDURE — 3079F DIAST BP 80-89 MM HG: CPT | Performed by: INTERNAL MEDICINE

## 2023-12-11 PROCEDURE — G8420 CALC BMI NORM PARAMETERS: HCPCS | Performed by: INTERNAL MEDICINE

## 2023-12-11 PROCEDURE — G8400 PT W/DXA NO RESULTS DOC: HCPCS | Performed by: INTERNAL MEDICINE

## 2023-12-11 PROCEDURE — 1123F ACP DISCUSS/DSCN MKR DOCD: CPT | Performed by: INTERNAL MEDICINE

## 2023-12-11 PROCEDURE — G8427 DOCREV CUR MEDS BY ELIG CLIN: HCPCS | Performed by: INTERNAL MEDICINE

## 2023-12-11 PROCEDURE — 1036F TOBACCO NON-USER: CPT | Performed by: INTERNAL MEDICINE

## 2023-12-11 PROCEDURE — 99213 OFFICE O/P EST LOW 20 MIN: CPT | Performed by: INTERNAL MEDICINE

## 2023-12-11 PROCEDURE — 3017F COLORECTAL CA SCREEN DOC REV: CPT | Performed by: INTERNAL MEDICINE

## 2023-12-11 RX ORDER — ATORVASTATIN CALCIUM 40 MG/1
40 TABLET, FILM COATED ORAL NIGHTLY
Qty: 90 TABLET | Refills: 3 | Status: SHIPPED | OUTPATIENT
Start: 2023-12-11

## 2023-12-11 RX ORDER — ATORVASTATIN CALCIUM 10 MG/1
10 TABLET, FILM COATED ORAL NIGHTLY
COMMUNITY
Start: 2023-11-08 | End: 2023-12-11

## 2023-12-11 NOTE — PROGRESS NOTES
[address] [place] [address] [address]  [facility]  Dept: [phone]  Dept Fax: [phone]  Loc: [phone]    Visit Date: 12/11/2023    Ms. Georgia Bryan is a 76 y.o. female  who presented for:  Chief Complaint   Patient presents with    Follow-up     Wants to go over some recent testing and concerns. Other     Wants to discuss statin and whether it is necessary to take. HPI:   HPI   77 yo F with TAA 4.0 cm, PACs/PVCs, mild carotid plaque, abnormal coronary calcium score 137 who presents for follow-up. She is on statin therapy. . Cr 0.8. No CAI, orthopnea, PND, sob at rest, palpitations, LE edema, or syncope. She has these tests for work-up neurologic issues. She has had an ache down the left arm, dull. She does not have it now. When she works out she notes dizziness with changing positions. 2/2022 stress negative with DTS = +10.   RVSP 35 mmHg, preserved EF. Current Outpatient Medications:     metoprolol succinate (TOPROL XL) 50 MG extended release tablet, take 1 tablet by mouth once daily, Disp: 90 tablet, Rfl: 0    atorvastatin (LIPITOR) 10 MG tablet, Take 1 tablet by mouth nightly at bedtime. , Disp: , Rfl:     Past Medical History  Beryle Orf  has a past medical history of Aortic aneurysm, thoracic ([address]), GERD (gastroesophageal reflux disease), Hyperlipidemia, Hypertension, Mild intermittent asthma without complication, Mitral valve prolapse, Pneumonia, Pulmonary emphysema (HCC), SOB (shortness of breath), and Tricuspid regurgitation. Social History  Beryle Orf  reports that she has never smoked. She has never used smokeless tobacco. She reports current alcohol use of about 7.0 standard drinks of alcohol per week. She reports that she does not use drugs.     Family History  Beryle Orf family history includes Cancer in her mother; Dementia in her mother; Heart Disease in her paternal grandfather; Heart

## 2024-01-10 RX ORDER — METOPROLOL SUCCINATE 50 MG/1
50 TABLET, EXTENDED RELEASE ORAL DAILY
Qty: 90 TABLET | Refills: 3 | Status: SHIPPED | OUTPATIENT
Start: 2024-01-10

## 2024-03-20 NOTE — TELEPHONE ENCOUNTER
Raina is calling to request a new rx for atorvastatin 40 mg, called to [facility] [place], ph.[phone], she has refills at , but is in [place].

## 2024-03-22 RX ORDER — ATORVASTATIN CALCIUM 40 MG/1
40 TABLET, FILM COATED ORAL NIGHTLY
Qty: 90 TABLET | Refills: 3 | Status: SHIPPED | OUTPATIENT
Start: 2024-03-22

## 2024-04-22 RX ORDER — ATORVASTATIN CALCIUM 40 MG/1
40 TABLET, FILM COATED ORAL NIGHTLY
Qty: 90 TABLET | Refills: 3 | Status: SHIPPED | OUTPATIENT
Start: 2024-04-22

## 2024-04-22 NOTE — TELEPHONE ENCOUNTER
DONV=09-06-23. DONV=12-12-24. Raina is calling to see if she can get another rx for Generic Lipitor, 40 mg qd, she left her bottle that she just got refilled in [place]. She uses [facility] [employer].

## 2024-05-29 ENCOUNTER — HOSPITAL ENCOUNTER (OUTPATIENT)
Dept: CT IMAGING | Age: 69
Discharge: HOME | End: 2024-05-29
Attending: FAMILY MEDICINE
Payer: MEDICARE

## 2024-05-29 DIAGNOSIS — R91.1 PULMONARY NODULE: ICD-10-CM

## 2024-05-29 PROCEDURE — 71250 CT THORAX DX C-: CPT

## 2024-07-08 ENCOUNTER — OFFICE VISIT (OUTPATIENT)
Dept: CARDIOLOGY CLINIC | Age: 69
End: 2024-07-08
Payer: MEDICARE

## 2024-07-08 VITALS
SYSTOLIC BLOOD PRESSURE: 104 MMHG | HEIGHT: 70 IN | DIASTOLIC BLOOD PRESSURE: 72 MMHG | WEIGHT: 143 LBS | BODY MASS INDEX: 20.47 KG/M2 | HEART RATE: 73 BPM

## 2024-07-08 DIAGNOSIS — R00.2 HEART PALPITATIONS: Primary | ICD-10-CM

## 2024-07-08 DIAGNOSIS — I10 PRIMARY HYPERTENSION: ICD-10-CM

## 2024-07-08 DIAGNOSIS — R06.02 SHORTNESS OF BREATH: ICD-10-CM

## 2024-07-08 PROCEDURE — 3078F DIAST BP <80 MM HG: CPT | Performed by: PHYSICIAN ASSISTANT

## 2024-07-08 PROCEDURE — 3074F SYST BP LT 130 MM HG: CPT | Performed by: PHYSICIAN ASSISTANT

## 2024-07-08 PROCEDURE — 93000 ELECTROCARDIOGRAM COMPLETE: CPT | Performed by: PHYSICIAN ASSISTANT

## 2024-07-08 PROCEDURE — 99214 OFFICE O/P EST MOD 30 MIN: CPT | Performed by: PHYSICIAN ASSISTANT

## 2024-07-08 PROCEDURE — 1123F ACP DISCUSS/DSCN MKR DOCD: CPT | Performed by: PHYSICIAN ASSISTANT

## 2024-07-08 RX ORDER — ASPIRIN 81 MG/1
81 TABLET ORAL DAILY
COMMUNITY

## 2024-07-08 RX ORDER — METOPROLOL SUCCINATE 50 MG/1
50 TABLET, EXTENDED RELEASE ORAL DAILY
Qty: 90 TABLET | Refills: 3 | Status: SHIPPED | OUTPATIENT
Start: 2024-07-08

## 2024-07-08 RX ORDER — ATORVASTATIN CALCIUM 40 MG/1
40 TABLET, FILM COATED ORAL NIGHTLY
Qty: 90 TABLET | Refills: 3 | Status: SHIPPED | OUTPATIENT
Start: 2024-07-08

## 2024-07-08 NOTE — PROGRESS NOTES
[facility] [place] [facility]  [facility] CARDIOLOGY  [address]  [address]  [facility] [address]  Dept: [phone]  Dept Fax: [phone]  Loc: [phone]    Chief Complaint   Patient presents with    Follow-up     Palpitations, heart racing     Patient presents with complaint of increasing palpitations.  Patient states over the last few months she has noticed increased palpitations.  She has been more fatigued than usual.  She gets dyspnea on exertion when walking up a flight of stairs.  She is exercising regularly with a combination of cardio and weight training and states that her exercise tolerance has not changed.  She denies any chest pain.  She has had a history of PVCs in the past.  No prior history of heart catheterization.  Cardiologist:  Dr. Carcamo        General:   No fever, no chills, No fatigue or weight loss  Pulmonary:   CAI  Cardiac:    Denies recent chest pain   GI:     No nausea or vomiting, no abdominal pain  Neuro:    No dizziness or light headedness  Musculoskeletal:  No recent active issues  Extremities:   No edema, good peripheral pulses      Past Medical History:   Diagnosis Date    Aortic aneurysm, thoracic (HCC)     dilatation    GERD (gastroesophageal reflux disease)     Hyperlipidemia     Hypertension     Mild intermittent asthma without complication 2/23/2022    Mitral valve prolapse     Pneumonia     Pulmonary emphysema (HCC) 10/22/2020    SOB (shortness of breath)     Tricuspid regurgitation     mild       Allergies   Allergen Reactions    Ciprofloxacin Itching       Current Outpatient Medications   Medication Sig Dispense Refill    aspirin 81 MG EC tablet Take 1 tablet by mouth daily      atorvastatin (LIPITOR) 40 MG tablet Take 1 tablet by mouth nightly at bedtime. 90 tablet 3    metoprolol succinate (TOPROL XL) 50 MG extended release tablet Take 1 tablet by mouth daily 90 tablet 3     No current facility-administered medications for this

## 2024-07-19 ENCOUNTER — HOSPITAL ENCOUNTER (OUTPATIENT)
Dept: NUCLEAR MEDICINE | Age: 69
Discharge: HOME | End: 2024-07-19
Attending: PHYSICIAN ASSISTANT
Payer: MEDICARE

## 2024-07-19 ENCOUNTER — HOSPITAL ENCOUNTER (OUTPATIENT)
Age: 69
End: 2024-07-19
Attending: PHYSICIAN ASSISTANT
Payer: MEDICARE

## 2024-07-19 VITALS — HEIGHT: 70 IN | BODY MASS INDEX: 20.04 KG/M2 | WEIGHT: 140 LBS

## 2024-07-19 DIAGNOSIS — R06.02 SHORTNESS OF BREATH: ICD-10-CM

## 2024-07-19 LAB
ECHO BSA: 1.77 M2
NUC STRESS EJECTION FRACTION: 74 %
STRESS BASELINE DIAS BP: 64 MMHG
STRESS BASELINE HR: 56 BPM
STRESS BASELINE SYS BP: 111 MMHG
STRESS PEAK DIAS BP: 69 MMHG
STRESS PEAK SYS BP: 116 MMHG
STRESS PERCENT HR ACHIEVED: 62 %
STRESS POST PEAK HR: 94 BPM
STRESS RATE PRESSURE PRODUCT: NORMAL BPM*MMHG
STRESS ST DEPRESSION: 0 MM
STRESS STAGE 1 BP: NORMAL MMHG
STRESS STAGE 1 DURATION: 1 MIN:SEC
STRESS STAGE 1 HR: 91 BPM
STRESS STAGE 2 BP: NORMAL MMHG
STRESS STAGE 2 DURATION: 1 MIN:SEC
STRESS STAGE 2 HR: 94 BPM
STRESS STAGE 3 BP: NORMAL MMHG
STRESS STAGE 3 DURATION: 1 MIN:SEC
STRESS STAGE 3 HR: 88 BPM
STRESS STAGE RECOVERY 1 BP: NORMAL MMHG
STRESS STAGE RECOVERY 1 DURATION: 1 MIN:SEC
STRESS STAGE RECOVERY 1 HR: 80 BPM
STRESS STAGE RECOVERY 2 BP: NORMAL MMHG
STRESS STAGE RECOVERY 2 DURATION: 1 MIN:SEC
STRESS STAGE RECOVERY 2 HR: 79 BPM
STRESS STAGE RECOVERY 3 BP: NORMAL MMHG
STRESS STAGE RECOVERY 3 DURATION: 1 MIN:SEC
STRESS STAGE RECOVERY 3 HR: 74 BPM
STRESS STAGE RECOVERY 4 BP: NORMAL MMHG
STRESS STAGE RECOVERY 4 DURATION: 1 MIN:SEC
STRESS STAGE RECOVERY 4 HR: 75 BPM
STRESS TARGET HR: 152 BPM
TID: 0.89

## 2024-07-19 PROCEDURE — 6360000002 HC RX W HCPCS: Performed by: PHYSICIAN ASSISTANT

## 2024-07-19 PROCEDURE — 3430000000 HC RX DIAGNOSTIC RADIOPHARMACEUTICAL: Performed by: INTERNAL MEDICINE

## 2024-07-19 PROCEDURE — 78452 HT MUSCLE IMAGE SPECT MULT: CPT | Performed by: INTERNAL MEDICINE

## 2024-07-19 PROCEDURE — 78452 HT MUSCLE IMAGE SPECT MULT: CPT

## 2024-07-19 PROCEDURE — 93018 CV STRESS TEST I&R ONLY: CPT | Performed by: INTERNAL MEDICINE

## 2024-07-19 PROCEDURE — 93017 CV STRESS TEST TRACING ONLY: CPT

## 2024-07-19 PROCEDURE — 93016 CV STRESS TEST SUPVJ ONLY: CPT | Performed by: INTERNAL MEDICINE

## 2024-07-19 PROCEDURE — A9500 TC99M SESTAMIBI: HCPCS | Performed by: INTERNAL MEDICINE

## 2024-07-19 RX ORDER — TETRAKIS(2-METHOXYISOBUTYLISOCYANIDE)COPPER(I) TETRAFLUOROBORATE 1 MG/ML
26.4 INJECTION, POWDER, LYOPHILIZED, FOR SOLUTION INTRAVENOUS
Status: COMPLETED | OUTPATIENT
Start: 2024-07-19 | End: 2024-07-19

## 2024-07-19 RX ORDER — TETRAKIS(2-METHOXYISOBUTYLISOCYANIDE)COPPER(I) TETRAFLUOROBORATE 1 MG/ML
9 INJECTION, POWDER, LYOPHILIZED, FOR SOLUTION INTRAVENOUS
Status: COMPLETED | OUTPATIENT
Start: 2024-07-19 | End: 2024-07-19

## 2024-07-19 RX ORDER — REGADENOSON 0.08 MG/ML
0.4 INJECTION, SOLUTION INTRAVENOUS
Status: COMPLETED | OUTPATIENT
Start: 2024-07-19 | End: 2024-07-19

## 2024-07-19 RX ADMIN — Medication 26.4 MILLICURIE: at 09:03

## 2024-07-19 RX ADMIN — Medication 9 MILLICURIE: at 08:08

## 2024-07-19 RX ADMIN — REGADENOSON 0.4 MG: 0.08 INJECTION, SOLUTION INTRAVENOUS at 09:01

## 2024-07-30 ENCOUNTER — OFFICE VISIT (OUTPATIENT)
Dept: CARDIOLOGY CLINIC | Age: 69
End: 2024-07-30
Payer: MEDICARE

## 2024-07-30 VITALS
DIASTOLIC BLOOD PRESSURE: 72 MMHG | BODY MASS INDEX: 20.59 KG/M2 | HEIGHT: 70 IN | HEART RATE: 76 BPM | SYSTOLIC BLOOD PRESSURE: 112 MMHG | WEIGHT: 143.8 LBS

## 2024-07-30 DIAGNOSIS — R00.2 PALPITATIONS: Primary | ICD-10-CM

## 2024-07-30 PROCEDURE — 99214 OFFICE O/P EST MOD 30 MIN: CPT | Performed by: PHYSICIAN ASSISTANT

## 2024-07-30 PROCEDURE — 3078F DIAST BP <80 MM HG: CPT | Performed by: PHYSICIAN ASSISTANT

## 2024-07-30 PROCEDURE — 1123F ACP DISCUSS/DSCN MKR DOCD: CPT | Performed by: PHYSICIAN ASSISTANT

## 2024-07-30 PROCEDURE — 3074F SYST BP LT 130 MM HG: CPT | Performed by: PHYSICIAN ASSISTANT

## 2024-07-30 NOTE — PROGRESS NOTES
10 mmHg, the estimated RVSP = 35   mmHg.      Pulmonic Valve   The pulmonic valve leaflets exhibited normal thickness, no calcification,   and normal cuspal separation. DOPPLER: The transpulmonic velocity was   within the normal range with trace regurgitation.      Left Atrium   Left atrial size was normal.      Left Ventricle   Normal left ventricular size and systolic function.   There were no regional wall motion abnormalities.   Wall thickness was within normal limits.   Ejection fraction was estimated at 60-65%.      Right Atrium   Right atrial size was normal.      Right Ventricle   The right ventricular size was normal with normal systolic function and   wall thickness.      Pericardial Effusion   The pericardium was normal in appearance with no evidence of a pericardial   effusion.      Pleural Effusion   No evidence of pleural effusion.      Aorta / Great Vessels   -Ascending aorta = 4.0 cm.   -IVC size is within normal limits with normal respiratory phasic changes.     M-Mode/2D Measurements & Calculations      LV Diastolic    LV Systolic Dimension: 3 cm  LA Dimension: 3.1 cmAO Root   Dimension: 4.6  LV Volume Diastolic: 97.3 ml Dimension: 2.9 cmLA Area:   cm              LV Volume Systolic: 35 ml    15.7 cm^2   LV FS:34.8 %    LV EDV/LV EDV Index: 97.3   LV PW           ml/54 m^2LV ESV/LV ESV   Diastolic: 1 cm Index: 35 ml/19 m^2   Septum          EF Calculated: 64 %          RV Diastolic Dimension: 2.8   Diastolic: 0.8                               cm   cm                                                LA/Aorta: 1.07                                                   LA volume/Index: 40.9 ml                                                /23m^2     Doppler Measurements & Calculations      MV Peak E-Wave: 61.3 cm/s  AV Peak Velocity: 130 LVOT Peak Velocity: 88.7   MV Peak A-Wave: 61.7 cm/s  cm/s                  cm/s   MV E/A Ratio: 0.99         AV Peak Gradient:     LVOT Peak Gradient: 3   MV Peak

## 2024-07-31 ENCOUNTER — APPOINTMENT (OUTPATIENT)
Dept: GENERAL RADIOLOGY | Age: 69
End: 2024-07-31
Payer: MEDICARE

## 2024-07-31 ENCOUNTER — HOSPITAL ENCOUNTER (EMERGENCY)
Age: 69
Discharge: HOME | End: 2024-07-31
Payer: MEDICARE

## 2024-07-31 VITALS
OXYGEN SATURATION: 98 % | TEMPERATURE: 98 F | SYSTOLIC BLOOD PRESSURE: 120 MMHG | DIASTOLIC BLOOD PRESSURE: 74 MMHG | RESPIRATION RATE: 16 BRPM | HEART RATE: 73 BPM

## 2024-07-31 DIAGNOSIS — S63.502A LEFT WRIST SPRAIN, INITIAL ENCOUNTER: Primary | ICD-10-CM

## 2024-07-31 PROCEDURE — 99213 OFFICE O/P EST LOW 20 MIN: CPT

## 2024-07-31 PROCEDURE — 99212 OFFICE O/P EST SF 10 MIN: CPT | Performed by: EMERGENCY MEDICINE

## 2024-07-31 PROCEDURE — 73110 X-RAY EXAM OF WRIST: CPT

## 2024-07-31 ASSESSMENT — PAIN - FUNCTIONAL ASSESSMENT: PAIN_FUNCTIONAL_ASSESSMENT: 0-10

## 2024-07-31 ASSESSMENT — PAIN DESCRIPTION - ORIENTATION: ORIENTATION: LEFT

## 2024-07-31 ASSESSMENT — PAIN SCALES - GENERAL: PAINLEVEL_OUTOF10: 5

## 2024-07-31 ASSESSMENT — PAIN DESCRIPTION - LOCATION: LOCATION: WRIST

## 2024-07-31 NOTE — DISCHARGE INSTRUCTIONS
Continue Tylenol or ibuprofen as needed for pain    Ice to the area may be helpful    Wear the wrist splint for comfort and support.  Take off as needed    Return for new or worsening symptoms

## 2024-07-31 NOTE — ED PROVIDER NOTES
[facility] URGENT CARE  Urgent Care Encounter       CHIEF COMPLAINT       Chief Complaint   Patient presents with    Wrist Pain     Left, 3 weeks       Nurses Notes reviewed and I agree except as noted in the HPI.  HISTORY OF PRESENT ILLNESS   Raina Obrien is a 68 y.o. female who presents for complaint of left wrist/hand pain.  Patient states that she fell on outstretched hand 3 weeks ago while playing pickle ball.  She has pain to the area since.  It sometimes keeps her awake at night.  She states carrying heavy things and twisting motions of the wrist increases her pain    HPI    REVIEW OF SYSTEMS     Review of Systems   Constitutional:  Negative for activity change, fatigue and fever.   Musculoskeletal:  Positive for arthralgias (left wrist/hand).       PAST MEDICAL HISTORY         Diagnosis Date    Aortic aneurysm, thoracic (HCC)     dilatation    GERD (gastroesophageal reflux disease)     Hyperlipidemia     Hypertension     Mild intermittent asthma without complication 2022    Mitral valve prolapse     Pneumonia     Pulmonary emphysema (HCC) 10/22/2020    SOB (shortness of breath)     Tricuspid regurgitation     mild       SURGICALHISTORY     Patient  has a past surgical history that includes Cardiac catheterization (3/4/2010); knee surgery; Tonsillectomy; and  section.    CURRENT MEDICATIONS       Previous Medications    ASPIRIN 81 MG EC TABLET    Take 1 tablet by mouth daily    ATORVASTATIN (LIPITOR) 40 MG TABLET    Take 1 tablet by mouth nightly at bedtime.    METOPROLOL SUCCINATE (TOPROL XL) 50 MG EXTENDED RELEASE TABLET    Take 1 tablet by mouth daily       ALLERGIES     Patient is is allergic to ciprofloxacin.    Patients   Immunization History   Administered Date(s) Administered    COVID-19, [place] Vaccine, Vaccine Unspecified 2021    Influenza Whole 01/10/2013    TDaP, ADACEL (age 10y-64y), BOOSTRIX (age 10y+), IM, 0.5mL 2019       FAMILY HISTORY     Patient's  family history includes Cancer in her mother; Dementia in her mother; Heart Disease in her paternal grandfather; Heart Disease (age of onset: 90) in her father; High Blood Pressure in her mother.    SOCIAL HISTORY     Patient  reports that she has never smoked. She has never used smokeless tobacco. She reports current alcohol use of about 7.0 standard drinks of alcohol per week. She reports that she does not use drugs.    PHYSICAL EXAM     ED TRIAGE VITALS  BP: 120/74, Temp: 98 °F (36.7 °C), Pulse: 73, Respirations: 16, SpO2: 98 %,Estimated body mass index is 20.63 kg/m² as calculated from the following:    Height as of 7/30/24: 1.778 m (5' 10\").    Weight as of 7/30/24: 65.2 kg (143 lb 12.8 oz).,No LMP recorded. Patient is postmenopausal.    Physical Exam  Constitutional:       General: She is not in acute distress.     Appearance: She is normal weight.   Cardiovascular:      Rate and Rhythm: Normal rate.   Pulmonary:      Effort: Pulmonary effort is normal.   Musculoskeletal:      Left wrist: Tenderness, bony tenderness and snuff box tenderness present. No swelling, deformity or crepitus.   Skin:     General: Skin is warm and dry.   Neurological:      Mental Status: She is alert.         DIAGNOSTIC RESULTS     Labs:No results found for this visit on 07/31/24.    IMAGING:    XR WRIST LEFT (MIN 3 VIEWS)   Final Result      No fracture or dislocation.            **This report has been created using voice recognition software. It may contain   minor errors which are inherent in voice recognition technology.**         Electronically signed by Dr. Cali Stewart            EKG:      URGENT CARE COURSE:     Vitals:    07/31/24 1530   BP: 120/74   Pulse: 73   Resp: 16   Temp: 98 °F (36.7 °C)   TempSrc: Oral   SpO2: 98%       Medications - No data to display         PROCEDURES:  None    FINAL IMPRESSION      1. Left wrist sprain, initial encounter          DISPOSITION/ PLAN     Patient presents for what is likely left

## 2024-10-07 ENCOUNTER — HOSPITAL ENCOUNTER (OUTPATIENT)
Age: 69
Discharge: HOME | End: 2024-10-09
Attending: PHYSICIAN ASSISTANT
Payer: MEDICARE

## 2024-10-07 DIAGNOSIS — R00.2 PALPITATIONS: ICD-10-CM

## 2024-10-07 PROCEDURE — 93270 REMOTE 30 DAY ECG REV/REPORT: CPT

## 2024-10-18 ENCOUNTER — APPOINTMENT (OUTPATIENT)
Dept: GENERAL RADIOLOGY | Age: 69
End: 2024-10-18
Payer: MEDICARE

## 2024-10-18 ENCOUNTER — HOSPITAL ENCOUNTER (EMERGENCY)
Age: 69
Discharge: HOME | End: 2024-10-18
Payer: MEDICARE

## 2024-10-18 VITALS
BODY MASS INDEX: 19.8 KG/M2 | DIASTOLIC BLOOD PRESSURE: 75 MMHG | WEIGHT: 138 LBS | TEMPERATURE: 98.3 F | OXYGEN SATURATION: 99 % | HEART RATE: 83 BPM | RESPIRATION RATE: 20 BRPM | SYSTOLIC BLOOD PRESSURE: 135 MMHG

## 2024-10-18 DIAGNOSIS — S93.491A SPRAIN OF OTHER LIGAMENT OF RIGHT ANKLE, INITIAL ENCOUNTER: Primary | ICD-10-CM

## 2024-10-18 PROCEDURE — 99213 OFFICE O/P EST LOW 20 MIN: CPT

## 2024-10-18 PROCEDURE — 73610 X-RAY EXAM OF ANKLE: CPT

## 2024-10-18 ASSESSMENT — PAIN DESCRIPTION - FREQUENCY: FREQUENCY: CONTINUOUS

## 2024-10-18 ASSESSMENT — PAIN DESCRIPTION - PAIN TYPE: TYPE: ACUTE PAIN

## 2024-10-18 ASSESSMENT — PAIN - FUNCTIONAL ASSESSMENT: PAIN_FUNCTIONAL_ASSESSMENT: 0-10

## 2024-10-18 ASSESSMENT — PAIN DESCRIPTION - ORIENTATION: ORIENTATION: LEFT

## 2024-10-18 ASSESSMENT — PAIN DESCRIPTION - LOCATION: LOCATION: ANKLE

## 2024-10-18 ASSESSMENT — PAIN DESCRIPTION - DESCRIPTORS: DESCRIPTORS: ACHING

## 2024-10-18 ASSESSMENT — PAIN SCALES - GENERAL: PAINLEVEL_OUTOF10: 5

## 2024-10-18 NOTE — ED PROVIDER NOTES
[facility] URGENT CARE  Urgent Care Encounter      CHIEF COMPLAINT       Chief Complaint   Patient presents with    Ankle Injury     Right           Nurses Notes reviewed and I agree except as noted in the HPI.  HISTORY OF PRESENT ILLNESS   Raian Obrien is a 69 y.o. female who presents to urgent care with complaints of right ankle injury. Patient reports she was at [employer] yesterday evening when she went to get out of the meeks and twisted her right ankle. Denies numbness, tingling, or loss of sensation. Reports she has broke her right ankle previously in the past.     REVIEW OF SYSTEMS     Review of Systems   Musculoskeletal:  Positive for myalgias.   Neurological:  Negative for weakness and numbness.       PAST MEDICAL HISTORY         Diagnosis Date    Aortic aneurysm, thoracic (HCC)     dilatation    GERD (gastroesophageal reflux disease)     Hyperlipidemia     Hypertension     Mild intermittent asthma without complication 2022    Mitral valve prolapse     Pneumonia     Pulmonary emphysema (HCC) 10/22/2020    SOB (shortness of breath)     Tricuspid regurgitation     mild       SURGICAL HISTORY     Patient  has a past surgical history that includes Cardiac catheterization (3/4/2010); knee surgery; Tonsillectomy; and  section.    CURRENT MEDICATIONS       Discharge Medication List as of 10/18/2024 10:33 AM        CONTINUE these medications which have NOT CHANGED    Details   aspirin 81 MG EC tablet Take 1 tablet by mouth dailyHistorical Med      atorvastatin (LIPITOR) 40 MG tablet Take 1 tablet by mouth nightly at bedtime., Disp-90 tablet, R-3Normal      metoprolol succinate (TOPROL XL) 50 MG extended release tablet Take 1 tablet by mouth daily, Disp-90 tablet, R-3Normal             ALLERGIES     Patient is is allergic to ciprofloxacin.    FAMILY HISTORY     Patient'sfamily history includes Cancer in her mother; Dementia in her mother; Heart Disease in her paternal grandfather;

## 2024-10-18 NOTE — ED TRIAGE NOTES
Patient ambulated to room with c/o right ankle redness, edema, and pain beginning yesterday after twisting the ankle while exiting a meeks. Edema noted to outer right ankle.

## 2024-10-30 ENCOUNTER — TELEPHONE (OUTPATIENT)
Dept: CARDIOLOGY CLINIC | Age: 69
End: 2024-10-30

## 2024-10-30 NOTE — TELEPHONE ENCOUNTER
Event monitor did not show any significant arrhythmia.  She occasional PVCs.  Recommend that she keep her F/U with Dr Carcamo.

## 2024-11-22 ENCOUNTER — HOSPITAL ENCOUNTER (OUTPATIENT)
Dept: INTERVENTIONAL RADIOLOGY/VASCULAR | Age: 69
Discharge: HOME | End: 2024-11-22
Payer: MEDICARE

## 2024-11-22 ENCOUNTER — HOSPITAL ENCOUNTER (OUTPATIENT)
Dept: MRI IMAGING | Age: 69
Discharge: HOME | End: 2024-11-22
Payer: MEDICARE

## 2024-11-22 DIAGNOSIS — Z86.73 HISTORY OF STROKE: ICD-10-CM

## 2024-11-22 DIAGNOSIS — E23.7 PITUITARY ABNORMALITY (HCC): ICD-10-CM

## 2024-11-22 LAB — POC CREATININE WHOLE BLOOD: 0.9 MG/DL (ref 0.5–1.2)

## 2024-11-22 PROCEDURE — 6360000004 HC RX CONTRAST MEDICATION: Performed by: PSYCHIATRY & NEUROLOGY

## 2024-11-22 PROCEDURE — 82565 ASSAY OF CREATININE: CPT

## 2024-11-22 PROCEDURE — A9579 GAD-BASE MR CONTRAST NOS,1ML: HCPCS | Performed by: PSYCHIATRY & NEUROLOGY

## 2024-11-22 PROCEDURE — 70553 MRI BRAIN STEM W/O & W/DYE: CPT

## 2024-11-22 PROCEDURE — 93880 EXTRACRANIAL BILAT STUDY: CPT

## 2024-11-22 RX ADMIN — GADOTERIDOL 15 ML: 279.3 INJECTION, SOLUTION INTRAVENOUS at 15:36

## 2024-12-02 ENCOUNTER — TELEPHONE (OUTPATIENT)
Dept: CARDIOLOGY CLINIC | Age: 69
End: 2024-12-02

## 2024-12-02 NOTE — TELEPHONE ENCOUNTER
Patient has an appt on 12/12 with gali, and she is calling to see about sooner options. Please advise

## 2024-12-05 ENCOUNTER — OFFICE VISIT (OUTPATIENT)
Dept: CARDIOLOGY CLINIC | Age: 69
End: 2024-12-05

## 2024-12-05 VITALS
HEART RATE: 75 BPM | WEIGHT: 145 LBS | DIASTOLIC BLOOD PRESSURE: 96 MMHG | SYSTOLIC BLOOD PRESSURE: 152 MMHG | BODY MASS INDEX: 20.76 KG/M2 | HEIGHT: 70 IN

## 2024-12-05 DIAGNOSIS — R00.2 PALPITATIONS: Primary | ICD-10-CM

## 2024-12-05 DIAGNOSIS — I71.20 THORACIC AORTIC ANEURYSM WITHOUT RUPTURE, UNSPECIFIED PART (HCC): ICD-10-CM

## 2024-12-05 RX ORDER — METOPROLOL SUCCINATE 50 MG/1
75 TABLET, EXTENDED RELEASE ORAL DAILY
Qty: 90 TABLET | Refills: 3 | Status: SHIPPED | OUTPATIENT
Start: 2024-12-05

## 2024-12-05 NOTE — PROGRESS NOTES
Follow-up, pt having more palpitations.   She states she is seeing a neurologist and was told she had an old stroke.   They have referred her to Dr. Obando.    Scans also showed an enlarged pituitary and will be referred to Endocrinology but appt hasn't been set up yet.     EKG completed.

## 2024-12-05 NOTE — PATIENT INSTRUCTIONS
Your nurses today were MITALI Calvillo and JOE Parish  Your provider today was Dr. Carcamo  Phone number: [phone]
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
## 2024-12-06 ENCOUNTER — TELEPHONE (OUTPATIENT)
Dept: CARDIOLOGY CLINIC | Age: 69
End: 2024-12-06

## 2024-12-06 DIAGNOSIS — R06.02 SHORTNESS OF BREATH: ICD-10-CM

## 2024-12-06 DIAGNOSIS — R42 DIZZINESS: ICD-10-CM

## 2024-12-06 DIAGNOSIS — R00.2 HEART PALPITATIONS: ICD-10-CM

## 2024-12-06 DIAGNOSIS — R00.2 PALPITATIONS: Primary | ICD-10-CM

## 2024-12-06 NOTE — TELEPHONE ENCOUNTER
Patient calling in requesting a referral to [facility] for an executive physical. She said at her visit yesterday it was discussed that we could refer her after she sees Dr. Obando but is interested in getting referred for this physical.  She does not feel right. And with all the recent testing and findings she wants to get the referral placed.     Ok to refer?

## 2024-12-16 ENCOUNTER — HOSPITAL ENCOUNTER (OUTPATIENT)
Age: 69
Discharge: HOME | End: 2024-12-16
Payer: MEDICARE

## 2024-12-16 ENCOUNTER — OFFICE VISIT (OUTPATIENT)
Age: 69
End: 2024-12-16
Payer: MEDICARE

## 2024-12-16 VITALS
WEIGHT: 146.6 LBS | HEIGHT: 70 IN | DIASTOLIC BLOOD PRESSURE: 82 MMHG | BODY MASS INDEX: 20.99 KG/M2 | SYSTOLIC BLOOD PRESSURE: 124 MMHG | HEART RATE: 75 BPM

## 2024-12-16 DIAGNOSIS — E23.6 ENLARGED PITUITARY GLAND (HCC): Primary | ICD-10-CM

## 2024-12-16 LAB — T4 FREE SERPL-MCNC: 1.25 NG/DL (ref 0.93–1.68)

## 2024-12-16 PROCEDURE — 82670 ASSAY OF TOTAL ESTRADIOL: CPT

## 2024-12-16 PROCEDURE — 36415 COLL VENOUS BLD VENIPUNCTURE: CPT

## 2024-12-16 PROCEDURE — 1036F TOBACCO NON-USER: CPT | Performed by: INTERNAL MEDICINE

## 2024-12-16 PROCEDURE — 1160F RVW MEDS BY RX/DR IN RCRD: CPT | Performed by: INTERNAL MEDICINE

## 2024-12-16 PROCEDURE — G8484 FLU IMMUNIZE NO ADMIN: HCPCS | Performed by: INTERNAL MEDICINE

## 2024-12-16 PROCEDURE — 3079F DIAST BP 80-89 MM HG: CPT | Performed by: INTERNAL MEDICINE

## 2024-12-16 PROCEDURE — 99204 OFFICE O/P NEW MOD 45 MIN: CPT | Performed by: INTERNAL MEDICINE

## 2024-12-16 PROCEDURE — G8420 CALC BMI NORM PARAMETERS: HCPCS | Performed by: INTERNAL MEDICINE

## 2024-12-16 PROCEDURE — 3017F COLORECTAL CA SCREEN DOC REV: CPT | Performed by: INTERNAL MEDICINE

## 2024-12-16 PROCEDURE — 1159F MED LIST DOCD IN RCRD: CPT | Performed by: INTERNAL MEDICINE

## 2024-12-16 PROCEDURE — G8427 DOCREV CUR MEDS BY ELIG CLIN: HCPCS | Performed by: INTERNAL MEDICINE

## 2024-12-16 PROCEDURE — G8400 PT W/DXA NO RESULTS DOC: HCPCS | Performed by: INTERNAL MEDICINE

## 2024-12-16 PROCEDURE — 84305 ASSAY OF SOMATOMEDIN: CPT

## 2024-12-16 PROCEDURE — 3074F SYST BP LT 130 MM HG: CPT | Performed by: INTERNAL MEDICINE

## 2024-12-16 PROCEDURE — 1123F ACP DISCUSS/DSCN MKR DOCD: CPT | Performed by: INTERNAL MEDICINE

## 2024-12-16 PROCEDURE — 1090F PRES/ABSN URINE INCON ASSESS: CPT | Performed by: INTERNAL MEDICINE

## 2024-12-16 PROCEDURE — 84439 ASSAY OF FREE THYROXINE: CPT

## 2024-12-16 NOTE — PROGRESS NOTES
[facility] [place] [facility]  [facility] ENDOCRINOLOGY  [address]  [place] [address]  Dept: [phone]  Loc: [phone]     Visit Date: 12/16/2024  The patient (or guardian, if applicable) and other individuals in attendance with the patient were advised that Artificial Intelligence will be utilized during this visit to record, process the conversation to generate a clinical note, and support improvement of the AI technology. The patient (or guardian, if applicable) and other individuals in attendance at the appointment consented to the use of AI, including the recording.      Raina Obrien is a 69 y.o. female who presents today for:  Chief Complaint   Patient presents with    Disorder of pituitary gland            Subjective:      HPI   History of Present Illness  The patient presents for evaluation of an enlarged pituitary gland.    She underwent an MRI in 11/2023, which revealed an old infarct and a fullness of the pituitary gland. A subsequent MRI with contrast, conducted by a neurologist, confirmed these findings. She has been experiencing vertigo since 2023, along with blurred vision, occasional dizziness, lightheadedness, nausea, and tingling in her hands and feet. She also reports headaches and episodes of lightheadedness when leaning over to  objects. Her cardiologist, who was previously unaware of her pituitary enlargement, has expressed concern about this condition.  According to the patient, hormonal blood tests conducted 2 to 3 weeks ago were unremarkable.  I have not personally reviewed these labs.  She does not experience excessive urination or thirst but occasionally wakes up with a dry mouth. Her weight has remained stable, but she often feels cold. She experiences intermittent constipation but does not report any symptoms of depression. She has not noticed any skin discoloration or salt cravings. She does not experience foot swelling but

## 2024-12-17 LAB — ESTRADIOL LEVEL: 5 PG/ML

## 2024-12-18 LAB
IGF-I SERPL-MCNC: 101 NG/ML (ref 27–228)
IGF-I Z-SCORE SERPL: 0.1

## 2024-12-23 ENCOUNTER — OFFICE VISIT (OUTPATIENT)
Dept: NEUROSURGERY | Age: 69
End: 2024-12-23
Payer: MEDICARE

## 2024-12-23 ENCOUNTER — TELEPHONE (OUTPATIENT)
Age: 69
End: 2024-12-23

## 2024-12-23 VITALS
SYSTOLIC BLOOD PRESSURE: 126 MMHG | BODY MASS INDEX: 20.9 KG/M2 | DIASTOLIC BLOOD PRESSURE: 65 MMHG | HEIGHT: 70 IN | HEART RATE: 66 BPM | WEIGHT: 146 LBS

## 2024-12-23 DIAGNOSIS — D35.2 PITUITARY ADENOMA (HCC): Primary | ICD-10-CM

## 2024-12-23 PROCEDURE — 1036F TOBACCO NON-USER: CPT | Performed by: NEUROLOGICAL SURGERY

## 2024-12-23 PROCEDURE — 1159F MED LIST DOCD IN RCRD: CPT | Performed by: NEUROLOGICAL SURGERY

## 2024-12-23 PROCEDURE — 1090F PRES/ABSN URINE INCON ASSESS: CPT | Performed by: NEUROLOGICAL SURGERY

## 2024-12-23 PROCEDURE — 3074F SYST BP LT 130 MM HG: CPT | Performed by: NEUROLOGICAL SURGERY

## 2024-12-23 PROCEDURE — 3078F DIAST BP <80 MM HG: CPT | Performed by: NEUROLOGICAL SURGERY

## 2024-12-23 PROCEDURE — 99214 OFFICE O/P EST MOD 30 MIN: CPT | Performed by: NEUROLOGICAL SURGERY

## 2024-12-23 PROCEDURE — G8484 FLU IMMUNIZE NO ADMIN: HCPCS | Performed by: NEUROLOGICAL SURGERY

## 2024-12-23 PROCEDURE — 3017F COLORECTAL CA SCREEN DOC REV: CPT | Performed by: NEUROLOGICAL SURGERY

## 2024-12-23 PROCEDURE — G8427 DOCREV CUR MEDS BY ELIG CLIN: HCPCS | Performed by: NEUROLOGICAL SURGERY

## 2024-12-23 PROCEDURE — G8400 PT W/DXA NO RESULTS DOC: HCPCS | Performed by: NEUROLOGICAL SURGERY

## 2024-12-23 PROCEDURE — G8420 CALC BMI NORM PARAMETERS: HCPCS | Performed by: NEUROLOGICAL SURGERY

## 2024-12-23 PROCEDURE — 1123F ACP DISCUSS/DSCN MKR DOCD: CPT | Performed by: NEUROLOGICAL SURGERY

## 2024-12-23 ASSESSMENT — ENCOUNTER SYMPTOMS
ABDOMINAL DISTENTION: 0
BACK PAIN: 0
CHEST TIGHTNESS: 0

## 2024-12-23 NOTE — PROGRESS NOTES
[facility] NEUROSURGERY DEPARTMENT  [address]  [address]  [place] [id number]  Dept: [phone]  Dept Fax: [phone]      Patient Name:  Raina Obrien  Visit Date:  2024    HPI:     Ms. Obrien is a 69 y.o. female that presents today at [facility] for evaluation of the following:      Chief Complaint   Patient presents with    New Patient     New patient        HPI   This is a 69-year-old female who presented today with her  as a new referral.  Patient underwent a brain MRI that showed a fullness in her parotid gland about 9 mm.  Patient underwent that brain MRI is overall of her work up her low up for dizziness and vertigo.  Reported also blurry vision patient patient underwent pulmonary evaluation recently and she found to have some sort of nasal visual field deficit  She denies any significant fatigue issues or any discharge from her chest.  Patient is being followed up by endocrinologist at this time.     Medications:    Current Outpatient Medications:     metoprolol succinate (TOPROL XL) 50 MG extended release tablet, Take 1.5 tablets by mouth daily, Disp: 90 tablet, Rfl: 3    aspirin 81 MG EC tablet, Take 1 tablet by mouth daily, Disp: , Rfl:     atorvastatin (LIPITOR) 40 MG tablet, Take 1 tablet by mouth nightly at bedtime., Disp: 90 tablet, Rfl: 3    The patient is allergic to ciprofloxacin.    Past Medical History  Raina  has a past medical history of Aneurysm (HCC), Aortic aneurysm, thoracic (HCC), GERD (gastroesophageal reflux disease), Hyperlipidemia, Hypertension, Mild intermittent asthma without complication, Mitral valve prolapse, Pneumonia, Pulmonary emphysema (HCC), SOB (shortness of breath), and Tricuspid regurgitation.    Past Surgical History  The patient  has a past surgical history that includes Cardiac catheterization (3/4/2010); knee surgery; Tonsillectomy; and  section.    Family History  This

## 2025-04-11 ENCOUNTER — HOSPITAL ENCOUNTER (EMERGENCY)
Age: 70
Discharge: HOME | End: 2025-04-11
Payer: MEDICARE

## 2025-04-11 VITALS
BODY MASS INDEX: 20.04 KG/M2 | OXYGEN SATURATION: 97 % | SYSTOLIC BLOOD PRESSURE: 137 MMHG | TEMPERATURE: 97.7 F | DIASTOLIC BLOOD PRESSURE: 86 MMHG | WEIGHT: 140 LBS | RESPIRATION RATE: 18 BRPM | HEART RATE: 58 BPM | HEIGHT: 70 IN

## 2025-04-11 DIAGNOSIS — J06.9 VIRAL URI: Primary | ICD-10-CM

## 2025-04-11 LAB — S PYO AG THROAT QL: NEGATIVE

## 2025-04-11 PROCEDURE — 99213 OFFICE O/P EST LOW 20 MIN: CPT

## 2025-04-11 PROCEDURE — 99213 OFFICE O/P EST LOW 20 MIN: CPT | Performed by: NURSE PRACTITIONER

## 2025-04-11 PROCEDURE — 87651 STREP A DNA AMP PROBE: CPT

## 2025-04-11 ASSESSMENT — ENCOUNTER SYMPTOMS: SORE THROAT: 1

## 2025-04-11 ASSESSMENT — PAIN - FUNCTIONAL ASSESSMENT
PAIN_FUNCTIONAL_ASSESSMENT: ACTIVITIES ARE NOT PREVENTED
PAIN_FUNCTIONAL_ASSESSMENT: 0-10

## 2025-04-11 ASSESSMENT — PAIN DESCRIPTION - LOCATION: LOCATION: THROAT

## 2025-04-11 ASSESSMENT — PAIN SCALES - GENERAL: PAINLEVEL_OUTOF10: 6

## 2025-04-11 ASSESSMENT — PAIN DESCRIPTION - FREQUENCY: FREQUENCY: CONTINUOUS

## 2025-04-11 ASSESSMENT — PAIN DESCRIPTION - ONSET: ONSET: GRADUAL

## 2025-04-11 ASSESSMENT — PAIN DESCRIPTION - DESCRIPTORS: DESCRIPTORS: SHARP

## 2025-04-11 ASSESSMENT — PAIN DESCRIPTION - PAIN TYPE: TYPE: ACUTE PAIN

## 2025-04-11 NOTE — ED PROVIDER NOTES
[facility] URGENT CARE  UrgentCare Encounter      CHIEFCOMPLAINT       Chief Complaint   Patient presents with    Pharyngitis    Headache     Sore throat and headache for the last couple of days, just flew back from [place] on Monday.        Nurses Notes reviewed and I agree except as noted in the HPI.  HISTORY OF PRESENT ILLNESS     Raina Obrien is a 69 y.o. female who presents to the urgent care for evaluation.  The history is provided by the patient.   Cold Symptoms  Presenting symptoms: sore throat (with concern for strep throat)    Duration:  2 days  Associated symptoms: headaches          The patient/patient representative has no other acute complaints at this time.    REVIEW OF SYSTEMS     Review of Systems   Constitutional:  Positive for chills.   HENT:  Positive for sore throat (with concern for strep throat).    Neurological:  Positive for headaches.   All other systems reviewed and are negative.      PAST MEDICAL HISTORY         Diagnosis Date    Aneurysm     Aortic aneurysm, thoracic     dilatation    GERD (gastroesophageal reflux disease)     Hyperlipidemia     Hypertension     Mild intermittent asthma without complication 2022    Mitral valve prolapse     Pneumonia     Pulmonary emphysema (HCC) 10/22/2020    SOB (shortness of breath)     Tricuspid regurgitation     mild       SURGICAL HISTORY     Patient  has a past surgical history that includes Cardiac catheterization (3/4/2010); knee surgery; Tonsillectomy; and  section.    CURRENT MEDICATIONS       Discharge Medication List as of 2025  5:34 PM        CONTINUE these medications which have NOT CHANGED    Details   metoprolol succinate (TOPROL XL) 50 MG extended release tablet Take 1.5 tablets by mouth daily, Disp-90 tablet, R-3Normal      aspirin 81 MG EC tablet Take 1 tablet by mouth dailyHistorical Med      atorvastatin (LIPITOR) 40 MG tablet Take 1 tablet by mouth nightly at bedtime., Disp-90 tablet, R-3Normal          Resp: 18   Temp: 97.7 °F (36.5 °C)   TempSrc: Temporal   SpO2: 97%   Weight: 63.5 kg (140 lb)   Height: 1.778 m (5' 10\")       Medications - No data to display  PROCEDURES:  FINALIMPRESSION      1. Viral URI        DISPOSITION/PLAN   DISPOSITION Decision To Discharge 04/11/2025 05:33:26 PM   DISPOSITION CONDITION Stable          Problem List Items Addressed This Visit    None  Visit Diagnoses         Viral URI    -  Primary    Relevant Medications    Dyclonine-Glycerin (CEPACOL SORE THROAT SPRAY) 0.1-33 % LIQD               The results of pertinent diagnostic studies and exam findings were discussed with patient/patient representative.   Shared decision-making was performed and patient/patient representative are agreeable that they are suitable for discharge at this time.  The patient’s provisional diagnosis and plan of care were discussed with the patient/patient representative who expressed understanding.  The patient/representative is given strict written and verbal instructions about care at home, including over-the-counter management, prescription information, follow-up, and signs and symptoms of worsening of condition, and the patient/patient representative did verbalize understanding of these instructions.  Patient will go to nearest emergency department if symptoms change or worsen, or for any sign or symptom deemed emergent by the patient or family members. Follow up as an outpatient with PCP within the next 3 days, or sooner if symptoms warrant.       PATIENT REFERRED TO:  Marcel Odonnell MD  [address] [address]  [phone]    Schedule an appointment as soon as possible for a visit in 3 days  as needed, For further evaluation., If symptoms change/worsen, go to the [facility]  [address] [place] [id number] [phone]  Schedule an appointment as soon as possible for a visit in 3 days  if you do not have a family

## 2025-04-16 DIAGNOSIS — D35.2 PITUITARY ADENOMA (HCC): Primary | ICD-10-CM

## 2025-05-27 ENCOUNTER — TRANSCRIBE ORDERS (OUTPATIENT)
Dept: ADMINISTRATIVE | Age: 70
End: 2025-05-27

## 2025-05-27 DIAGNOSIS — E23.7 PITUITARY DISORDER: ICD-10-CM

## 2025-05-27 DIAGNOSIS — E23.6: ICD-10-CM

## 2025-05-27 DIAGNOSIS — R90.89 ABNORMAL BRAIN MRI: Primary | ICD-10-CM

## 2025-06-03 ENCOUNTER — TELEPHONE (OUTPATIENT)
Dept: CARDIOLOGY CLINIC | Age: 70
End: 2025-06-03

## 2025-06-03 NOTE — TELEPHONE ENCOUNTER
Pt called and LM on nurse line that she is having some dental work done on 6/12/2025.  She was told to check with Dr. Carcamo if she needs an antibiotic.     Advise.  Notify patient with recommendations.

## 2025-06-11 ENCOUNTER — HOSPITAL ENCOUNTER (OUTPATIENT)
Dept: MRI IMAGING | Age: 70
Discharge: HOME | End: 2025-06-11
Payer: MEDICARE

## 2025-06-11 DIAGNOSIS — R90.89 ABNORMAL BRAIN MRI: ICD-10-CM

## 2025-06-11 DIAGNOSIS — E23.7 PITUITARY DISORDER: ICD-10-CM

## 2025-06-11 DIAGNOSIS — D35.2 PITUITARY ADENOMA (HCC): ICD-10-CM

## 2025-06-11 DIAGNOSIS — E23.6: ICD-10-CM

## 2025-06-11 LAB — POC CREATININE WHOLE BLOOD: 0.9 MG/DL (ref 0.5–1.2)

## 2025-06-11 PROCEDURE — A9579 GAD-BASE MR CONTRAST NOS,1ML: HCPCS | Performed by: NEUROLOGICAL SURGERY

## 2025-06-11 PROCEDURE — 6360000004 HC RX CONTRAST MEDICATION: Performed by: NEUROLOGICAL SURGERY

## 2025-06-11 PROCEDURE — 70553 MRI BRAIN STEM W/O & W/DYE: CPT

## 2025-06-11 PROCEDURE — 82565 ASSAY OF CREATININE: CPT

## 2025-06-11 RX ORDER — GADOTERIDOL 279.3 MG/ML
10 INJECTION INTRAVENOUS
Status: COMPLETED | OUTPATIENT
Start: 2025-06-11 | End: 2025-06-11

## 2025-06-11 RX ADMIN — GADOTERIDOL 10 ML: 279.3 INJECTION, SOLUTION INTRAVENOUS at 08:42

## 2025-06-17 ENCOUNTER — OFFICE VISIT (OUTPATIENT)
Dept: NEUROSURGERY | Age: 70
End: 2025-06-17
Payer: MEDICARE

## 2025-06-17 VITALS
SYSTOLIC BLOOD PRESSURE: 112 MMHG | BODY MASS INDEX: 20.04 KG/M2 | HEIGHT: 70 IN | HEART RATE: 69 BPM | WEIGHT: 140 LBS | DIASTOLIC BLOOD PRESSURE: 73 MMHG

## 2025-06-17 DIAGNOSIS — D35.2 PITUITARY ADENOMA (HCC): Primary | ICD-10-CM

## 2025-06-17 PROCEDURE — 1159F MED LIST DOCD IN RCRD: CPT | Performed by: PHYSICIAN ASSISTANT

## 2025-06-17 PROCEDURE — 1036F TOBACCO NON-USER: CPT | Performed by: PHYSICIAN ASSISTANT

## 2025-06-17 PROCEDURE — 1090F PRES/ABSN URINE INCON ASSESS: CPT | Performed by: PHYSICIAN ASSISTANT

## 2025-06-17 PROCEDURE — G8420 CALC BMI NORM PARAMETERS: HCPCS | Performed by: PHYSICIAN ASSISTANT

## 2025-06-17 PROCEDURE — 3017F COLORECTAL CA SCREEN DOC REV: CPT | Performed by: PHYSICIAN ASSISTANT

## 2025-06-17 PROCEDURE — 1123F ACP DISCUSS/DSCN MKR DOCD: CPT | Performed by: PHYSICIAN ASSISTANT

## 2025-06-17 PROCEDURE — 99214 OFFICE O/P EST MOD 30 MIN: CPT | Performed by: PHYSICIAN ASSISTANT

## 2025-06-17 PROCEDURE — 3078F DIAST BP <80 MM HG: CPT | Performed by: PHYSICIAN ASSISTANT

## 2025-06-17 PROCEDURE — 3074F SYST BP LT 130 MM HG: CPT | Performed by: PHYSICIAN ASSISTANT

## 2025-06-17 PROCEDURE — G8427 DOCREV CUR MEDS BY ELIG CLIN: HCPCS | Performed by: PHYSICIAN ASSISTANT

## 2025-06-17 PROCEDURE — G8400 PT W/DXA NO RESULTS DOC: HCPCS | Performed by: PHYSICIAN ASSISTANT

## 2025-06-17 NOTE — PROGRESS NOTES
[facility] [place] [facility]  [facility] NEUROSURGERY  [address]  [facility] [address]  Dept: [phone]  Dept Fax: [phone]  Loc: [phone]    Follow-up visit  Visit Date: 6/17/2025      Raina  is a 69 y.o. female who is returning to the office today for a follow-up visit for ongoing evaluation of findings consistent with pituitary microadenoma.  Patient was most recently seen and evaluated by Dr. Obando/neurosurgeon in our office setting as a referral on 12/23/2024.  A brain MRI imaging reviewed at that visit revealed a pituitary abnormality consistent with adenoma.  Continued follow-ups and observation by endocrinology and ophthalmology were recommended with today's visit scheduled to include an updated MRI as a comparison image.    She presents today having undergone a new MRI of the brain with and without contrast on 6/11/2025 with stable uniformly enlarged pituitary gland and pituitary tissue suggestive of pituitary macroadenoma.  The overall size is unchanged compared to prior imaging is estimated at 12 x 8 x 10 mm.        Of note: Patient was seen and evaluated by endocrinology in January with pituitary hormones noted to be normal and unlikely the pituitary functional pathology is present.  Repeat imaging for today was recommended with appropriate follow-ups with their services scheduled.    She arrives today unaccompanied ambulating without assistance absent any deficits or complaints.  She denies any significant changes in her general presentation since her initial evaluation by Dr. Obando/neurosurgeon 6 months prior.  She was seen, and evaluated by endocrinology who ruled out any abnormal function of the pituitary gland recommending annual or biannual imaging and follow-ups with our specialty as well as any and all other subspecialties.  She was seen by an optometrist but has not been evaluated by ophthalmology.  We reviewed the above imaging and

## 2025-07-14 ENCOUNTER — TRANSCRIBE ORDERS (OUTPATIENT)
Dept: ADMINISTRATIVE | Age: 70
End: 2025-07-14

## 2025-07-14 DIAGNOSIS — D35.2 PITUITARY ADENOMA (HCC): Primary | ICD-10-CM

## 2025-07-16 ENCOUNTER — HOSPITAL ENCOUNTER (EMERGENCY)
Age: 70
Discharge: HOME | End: 2025-07-16
Payer: MEDICARE

## 2025-07-16 VITALS
DIASTOLIC BLOOD PRESSURE: 70 MMHG | TEMPERATURE: 97.6 F | WEIGHT: 140 LBS | SYSTOLIC BLOOD PRESSURE: 103 MMHG | BODY MASS INDEX: 20.09 KG/M2 | RESPIRATION RATE: 18 BRPM | HEART RATE: 62 BPM | OXYGEN SATURATION: 98 %

## 2025-07-16 DIAGNOSIS — H61.21 IMPACTED CERUMEN OF RIGHT EAR: Primary | ICD-10-CM

## 2025-07-16 PROCEDURE — 99213 OFFICE O/P EST LOW 20 MIN: CPT

## 2025-07-16 PROCEDURE — 69209 REMOVE IMPACTED EAR WAX UNI: CPT

## 2025-07-16 ASSESSMENT — PAIN - FUNCTIONAL ASSESSMENT: PAIN_FUNCTIONAL_ASSESSMENT: NONE - DENIES PAIN

## 2025-07-16 NOTE — DISCHARGE INSTRUCTIONS
Continue allergy medication.   Increase water intake, frequent hand washing.  Tylenol / Ibuprofen as needed for fever and or pain.  Follow up with PCP in 3-5 days if no improvement or sooner with worsening symptoms.

## 2025-07-16 NOTE — ED PROVIDER NOTES
[facility] URGENT CARE  Urgent Care Encounter       CHIEF COMPLAINT       Chief Complaint   Patient presents with    Ear Pain     Right side       Nurses Notes reviewed and I agree except as noted in the HPI.  HISTORY OF PRESENT ILLNESS   Raina Obrien is a 69 y.o. female who presents with concerns of right ear pain for the past two days.     HPI    REVIEW OF SYSTEMS     Review of Systems   HENT:  Positive for ear pain.    All other systems reviewed and are negative.      PAST MEDICAL HISTORY         Diagnosis Date    Aneurysm     Aortic aneurysm, thoracic     dilatation    GERD (gastroesophageal reflux disease)     Hyperlipidemia     Hypertension     Mild intermittent asthma without complication 2022    Mitral valve prolapse     Pneumonia     Pulmonary emphysema (HCC) 10/22/2020    SOB (shortness of breath)     Tricuspid regurgitation     mild       SURGICALHISTORY     Patient  has a past surgical history that includes Cardiac catheterization (3/4/2010); knee surgery; Tonsillectomy; and  section.    CURRENT MEDICATIONS       Previous Medications    ASPIRIN 81 MG EC TABLET    Take 1 tablet by mouth daily    ATORVASTATIN (LIPITOR) 40 MG TABLET    Take 1 tablet by mouth nightly at bedtime.    DYCLONINE-GLYCERIN (CEPACOL SORE THROAT SPRAY) 0.1-33 % LIQD    Use according to package directions for sore throat.    METOPROLOL SUCCINATE (TOPROL XL) 50 MG EXTENDED RELEASE TABLET    Take 1.5 tablets by mouth daily       ALLERGIES     Patient is is allergic to ciprofloxacin.    Patients   Immunization History   Administered Date(s) Administered    COVID-19, [place] Vaccine, Vaccine Unspecified 2021    Influenza Whole 01/10/2013    TDaP, ADACEL (age 10y-64y), BOOSTRIX (age 10y+), IM, 0.5mL 2019       FAMILY HISTORY     Patient's family history includes Cancer in her mother; Dementia in her mother; Heart Disease in her paternal grandfather; Heart Disease (age of onset: 90) in her father; High  Blood Pressure in her mother.    SOCIAL HISTORY     Patient  reports that she has never smoked. She has never used smokeless tobacco. She reports current alcohol use of about 7.0 standard drinks of alcohol per week. She reports that she does not use drugs.    PHYSICAL EXAM     ED TRIAGE VITALS  BP: 103/70, Temp: 97.6 °F (36.4 °C), Pulse: 62, Respirations: 18, SpO2: 98 %,Estimated body mass index is 20.09 kg/m² as calculated from the following:    Height as of 6/17/25: 1.778 m (5' 10\").    Weight as of this encounter: 63.5 kg (140 lb).,No LMP recorded. Patient is postmenopausal.    Physical Exam  Vitals and nursing note reviewed.   Constitutional:       General: She is not in acute distress.     Appearance: Normal appearance. She is normal weight. She is not ill-appearing, toxic-appearing or diaphoretic.   HENT:      Right Ear: Decreased hearing noted. There is impacted cerumen.      Left Ear: Tympanic membrane, ear canal and external ear normal.      Ears:      Comments: Post ear irrigation, TM visualized and free from any abnormality. Patient reports improved hearing as well. Irrigation preformed by MARGI Crowley RN.   Eyes:      Pupils: Pupils are equal, round, and reactive to light.   Cardiovascular:      Rate and Rhythm: Normal rate and regular rhythm.      Heart sounds: Normal heart sounds.   Pulmonary:      Effort: Pulmonary effort is normal.   Skin:     General: Skin is warm and dry.      Capillary Refill: Capillary refill takes less than 2 seconds.   Neurological:      General: No focal deficit present.      Mental Status: She is alert.   Psychiatric:         Mood and Affect: Mood normal.         Behavior: Behavior is cooperative.         DIAGNOSTIC RESULTS     Labs:No results found for this visit on 07/16/25.    IMAGING:    No orders to display         EKG:      URGENT CARE COURSE:     Vitals:    07/16/25 1430 07/16/25 1433   BP: 103/70    Pulse: 62    Resp: 18    Temp: 97.6 °F (36.4 °C)    TempSrc: Oral

## 2025-07-23 ENCOUNTER — HOSPITAL ENCOUNTER (OUTPATIENT)
Dept: MRI IMAGING | Age: 70
Discharge: HOME | End: 2025-07-23

## 2025-07-23 DIAGNOSIS — D35.2 PITUITARY ADENOMA (HCC): ICD-10-CM

## 2025-07-23 RX ORDER — GADOTERIDOL 279.3 MG/ML
15 INJECTION INTRAVENOUS
Status: DISCONTINUED | OUTPATIENT
Start: 2025-07-23 | End: 2025-07-24 | Stop reason: HOSPADM

## 2025-08-29 ENCOUNTER — HOSPITAL ENCOUNTER (OUTPATIENT)
Dept: PHYSICAL THERAPY | Age: 70
Setting detail: THERAPIES SERIES
Discharge: HOME | End: 2025-08-29
Payer: MEDICARE

## 2025-08-29 PROCEDURE — 97110 THERAPEUTIC EXERCISES: CPT

## 2025-08-29 PROCEDURE — 97161 PT EVAL LOW COMPLEX 20 MIN: CPT

## 2025-08-29 ASSESSMENT — HOOS JR
HOOS JR TOTAL INTERVAL SCORE: 55.985
GOING UP OR DOWN STAIRS: MODERATE
HOOS JR RAW SCORE: 11
RISING FROM SITTING: MODERATE
WALKING ON UNEVEN SURFACE: MODERATE
LYING IN BED (TURNING OVER, MAINTAINING HIP POSITION): MODERATE
HOOS JR RAW SCORE: 11
SITTING: MILD
BENDING TO THE FLOOR TO PICK UP OBJECT: MODERATE

## 2025-09-02 ENCOUNTER — HOSPITAL ENCOUNTER (OUTPATIENT)
Dept: PHYSICAL THERAPY | Age: 70
Setting detail: THERAPIES SERIES
Discharge: HOME | End: 2025-09-02
Payer: MEDICARE

## 2025-09-02 PROCEDURE — 97110 THERAPEUTIC EXERCISES: CPT

## 2025-09-05 ENCOUNTER — HOSPITAL ENCOUNTER (OUTPATIENT)
Dept: PHYSICAL THERAPY | Age: 70
Setting detail: THERAPIES SERIES
Discharge: HOME | End: 2025-09-05
Payer: MEDICARE

## 2025-09-05 PROCEDURE — 97110 THERAPEUTIC EXERCISES: CPT
